# Patient Record
Sex: FEMALE | Race: WHITE | NOT HISPANIC OR LATINO | Employment: OTHER | URBAN - METROPOLITAN AREA
[De-identification: names, ages, dates, MRNs, and addresses within clinical notes are randomized per-mention and may not be internally consistent; named-entity substitution may affect disease eponyms.]

---

## 2017-04-05 ENCOUNTER — APPOINTMENT (OUTPATIENT)
Dept: WOMENS IMAGING | Facility: HOSPITAL | Age: 67
End: 2017-04-05

## 2017-04-05 PROCEDURE — 77067 SCR MAMMO BI INCL CAD: CPT | Performed by: RADIOLOGY

## 2017-04-05 PROCEDURE — 77063 BREAST TOMOSYNTHESIS BI: CPT | Performed by: RADIOLOGY

## 2018-04-10 ENCOUNTER — APPOINTMENT (OUTPATIENT)
Dept: WOMENS IMAGING | Facility: HOSPITAL | Age: 68
End: 2018-04-10

## 2018-04-10 PROCEDURE — 77067 SCR MAMMO BI INCL CAD: CPT | Performed by: RADIOLOGY

## 2018-04-10 PROCEDURE — 77063 BREAST TOMOSYNTHESIS BI: CPT | Performed by: RADIOLOGY

## 2018-04-10 PROCEDURE — 77080 DXA BONE DENSITY AXIAL: CPT | Performed by: RADIOLOGY

## 2018-08-30 ENCOUNTER — HOSPITAL ENCOUNTER (OUTPATIENT)
Dept: GENERAL RADIOLOGY | Facility: HOSPITAL | Age: 68
Discharge: HOME | End: 2018-08-30
Attending: SURGERY | Admitting: SURGERY

## 2018-08-30 ENCOUNTER — OFFICE VISIT (OUTPATIENT)
Dept: WOUND CARE | Facility: HOSPITAL | Age: 68
End: 2018-08-30
Attending: SURGERY

## 2018-08-30 DIAGNOSIS — L97.513 RIGHT FOOT ULCER, WITH NECROSIS OF MUSCLE (HCC): Primary | ICD-10-CM

## 2018-08-30 DIAGNOSIS — L97.513 RIGHT FOOT ULCER, WITH NECROSIS OF MUSCLE (HCC): ICD-10-CM

## 2018-08-30 PROCEDURE — 87205 SMEAR GRAM STAIN: CPT | Performed by: SURGERY

## 2018-08-30 PROCEDURE — 87070 CULTURE OTHR SPECIMN AEROBIC: CPT | Performed by: SURGERY

## 2018-08-30 PROCEDURE — G0463 HOSPITAL OUTPT CLINIC VISIT: HCPCS

## 2018-08-30 PROCEDURE — 73630 X-RAY EXAM OF FOOT: CPT

## 2018-08-30 PROCEDURE — 87075 CULTR BACTERIA EXCEPT BLOOD: CPT | Performed by: SURGERY

## 2018-09-02 LAB
BACTERIA SPEC AEROBE CULT: NORMAL
GRAM STN SPEC: NORMAL

## 2018-09-04 LAB — BACTERIA SPEC ANAEROBE CULT: NORMAL

## 2018-09-06 ENCOUNTER — OFFICE VISIT (OUTPATIENT)
Dept: WOUND CARE | Facility: HOSPITAL | Age: 68
End: 2018-09-06
Attending: SURGERY

## 2018-09-06 PROCEDURE — G0463 HOSPITAL OUTPT CLINIC VISIT: HCPCS

## 2018-11-16 ENCOUNTER — APPOINTMENT (OUTPATIENT)
Dept: WOUND CARE | Facility: HOSPITAL | Age: 68
End: 2018-11-16
Attending: SURGERY

## 2019-04-24 ENCOUNTER — APPOINTMENT (OUTPATIENT)
Dept: WOMENS IMAGING | Facility: HOSPITAL | Age: 69
End: 2019-04-24

## 2019-04-24 PROCEDURE — 77067 SCR MAMMO BI INCL CAD: CPT | Performed by: RADIOLOGY

## 2019-04-24 PROCEDURE — 77063 BREAST TOMOSYNTHESIS BI: CPT | Performed by: RADIOLOGY

## 2019-06-27 ENCOUNTER — TRANSCRIBE ORDERS (OUTPATIENT)
Dept: ADMINISTRATIVE | Facility: HOSPITAL | Age: 69
End: 2019-06-27

## 2019-06-27 DIAGNOSIS — R00.2 HEART PALPITATIONS: Primary | ICD-10-CM

## 2019-06-27 DIAGNOSIS — R94.31 ABNORMAL ELECTROCARDIOGRAM (ECG) (EKG): ICD-10-CM

## 2019-06-28 ENCOUNTER — HOSPITAL ENCOUNTER (OUTPATIENT)
Dept: CARDIOLOGY | Facility: HOSPITAL | Age: 69
Discharge: HOME | End: 2019-06-28
Admitting: INTERNAL MEDICINE

## 2019-06-28 DIAGNOSIS — R00.2 HEART PALPITATIONS: ICD-10-CM

## 2019-06-28 PROCEDURE — 93225 XTRNL ECG REC<48 HRS REC: CPT

## 2019-06-28 PROCEDURE — 93226 XTRNL ECG REC<48 HR SCAN A/R: CPT

## 2019-06-30 PROCEDURE — 93227 XTRNL ECG REC<48 HR R&I: CPT | Performed by: INTERNAL MEDICINE

## 2019-07-11 ENCOUNTER — TRANSCRIBE ORDERS (OUTPATIENT)
Dept: ADMINISTRATIVE | Facility: HOSPITAL | Age: 69
End: 2019-07-11

## 2019-07-11 DIAGNOSIS — R94.31 ABNORMAL EKG: Primary | ICD-10-CM

## 2019-07-12 ENCOUNTER — HOSPITAL ENCOUNTER (OUTPATIENT)
Dept: CARDIOLOGY | Facility: HOSPITAL | Age: 69
Discharge: HOME | End: 2019-07-12
Admitting: INTERNAL MEDICINE

## 2019-07-12 DIAGNOSIS — R00.2 HEART PALPITATIONS: ICD-10-CM

## 2019-07-12 LAB
AORTIC DIMENSIONLESS INDEX: 0.9 (DI)
BH CV ECHO MEAS - AI DEC SLOPE: 185 CM/SEC^2
BH CV ECHO MEAS - AI MAX PG: 65.9 MMHG
BH CV ECHO MEAS - AI MAX VEL: 406 CM/SEC
BH CV ECHO MEAS - AI P1/2T: 642.8 MSEC
BH CV ECHO MEAS - AO MAX PG (FULL): 0.96 MMHG
BH CV ECHO MEAS - AO MAX PG: 6.3 MMHG
BH CV ECHO MEAS - AO MEAN PG (FULL): 2 MMHG
BH CV ECHO MEAS - AO MEAN PG: 4 MMHG
BH CV ECHO MEAS - AO ROOT AREA (BSA CORRECTED): 1.7
BH CV ECHO MEAS - AO ROOT AREA: 6.2 CM^2
BH CV ECHO MEAS - AO ROOT DIAM: 2.8 CM
BH CV ECHO MEAS - AO V2 MAX: 125 CM/SEC
BH CV ECHO MEAS - AO V2 MEAN: 89.5 CM/SEC
BH CV ECHO MEAS - AO V2 VTI: 29.2 CM
BH CV ECHO MEAS - AVA(I,A): 2.5 CM^2
BH CV ECHO MEAS - AVA(I,D): 2.5 CM^2
BH CV ECHO MEAS - AVA(V,A): 2.6 CM^2
BH CV ECHO MEAS - AVA(V,D): 2.6 CM^2
BH CV ECHO MEAS - BSA(HAYCOCK): 1.6 M^2
BH CV ECHO MEAS - BSA: 1.6 M^2
BH CV ECHO MEAS - BZI_BMI: 21.6 KILOGRAMS/M^2
BH CV ECHO MEAS - BZI_METRIC_HEIGHT: 165.1 CM
BH CV ECHO MEAS - BZI_METRIC_WEIGHT: 59 KG
BH CV ECHO MEAS - EDV(CUBED): 50.7 ML
BH CV ECHO MEAS - EDV(MOD-SP2): 68 ML
BH CV ECHO MEAS - EDV(MOD-SP4): 63 ML
BH CV ECHO MEAS - EDV(TEICH): 58.1 ML
BH CV ECHO MEAS - EF(CUBED): 72.7 %
BH CV ECHO MEAS - EF(MOD-BP): 58 %
BH CV ECHO MEAS - EF(MOD-SP2): 57.4 %
BH CV ECHO MEAS - EF(MOD-SP4): 58.7 %
BH CV ECHO MEAS - EF(TEICH): 65.3 %
BH CV ECHO MEAS - ESV(CUBED): 13.8 ML
BH CV ECHO MEAS - ESV(MOD-SP2): 29 ML
BH CV ECHO MEAS - ESV(MOD-SP4): 26 ML
BH CV ECHO MEAS - ESV(TEICH): 20.2 ML
BH CV ECHO MEAS - FS: 35.1 %
BH CV ECHO MEAS - IVS/LVPW: 0.88
BH CV ECHO MEAS - IVSD: 0.7 CM
BH CV ECHO MEAS - LAT PEAK E' VEL: 8 CM/SEC
BH CV ECHO MEAS - LV DIASTOLIC VOL/BSA (35-75): 38.2 ML/M^2
BH CV ECHO MEAS - LV MASS(C)D: 75.4 GRAMS
BH CV ECHO MEAS - LV MASS(C)DI: 45.8 GRAMS/M^2
BH CV ECHO MEAS - LV MAX PG: 5.3 MMHG
BH CV ECHO MEAS - LV MEAN PG: 2 MMHG
BH CV ECHO MEAS - LV SYSTOLIC VOL/BSA (12-30): 15.8 ML/M^2
BH CV ECHO MEAS - LV V1 MAX: 115 CM/SEC
BH CV ECHO MEAS - LV V1 MEAN: 70.7 CM/SEC
BH CV ECHO MEAS - LV V1 VTI: 25.8 CM
BH CV ECHO MEAS - LVIDD: 3.7 CM
BH CV ECHO MEAS - LVIDS: 2.4 CM
BH CV ECHO MEAS - LVLD AP2: 7.2 CM
BH CV ECHO MEAS - LVLD AP4: 7.2 CM
BH CV ECHO MEAS - LVLS AP2: 5.4 CM
BH CV ECHO MEAS - LVLS AP4: 5.5 CM
BH CV ECHO MEAS - LVOT AREA (M): 2.8 CM^2
BH CV ECHO MEAS - LVOT AREA: 2.8 CM^2
BH CV ECHO MEAS - LVOT DIAM: 1.9 CM
BH CV ECHO MEAS - LVPWD: 0.8 CM
BH CV ECHO MEAS - MED PEAK E' VEL: 9 CM/SEC
BH CV ECHO MEAS - MV A DUR: 0.11 SEC
BH CV ECHO MEAS - MV A MAX VEL: 55.3 CM/SEC
BH CV ECHO MEAS - MV DEC SLOPE: 331 CM/SEC^2
BH CV ECHO MEAS - MV DEC TIME: 169 SEC
BH CV ECHO MEAS - MV E MAX VEL: 82 CM/SEC
BH CV ECHO MEAS - MV E/A: 1.5
BH CV ECHO MEAS - MV MAX PG: 3.7 MMHG
BH CV ECHO MEAS - MV MEAN PG: 2 MMHG
BH CV ECHO MEAS - MV P1/2T MAX VEL: 91.6 CM/SEC
BH CV ECHO MEAS - MV P1/2T: 81.1 MSEC
BH CV ECHO MEAS - MV V2 MAX: 96.1 CM/SEC
BH CV ECHO MEAS - MV V2 MEAN: 61.6 CM/SEC
BH CV ECHO MEAS - MV V2 VTI: 27.3 CM
BH CV ECHO MEAS - MVA P1/2T LCG: 2.4 CM^2
BH CV ECHO MEAS - MVA(P1/2T): 2.7 CM^2
BH CV ECHO MEAS - MVA(VTI): 2.7 CM^2
BH CV ECHO MEAS - PA ACC TIME: 0.1 SEC
BH CV ECHO MEAS - PA MAX PG (FULL): 0.81 MMHG
BH CV ECHO MEAS - PA MAX PG: 1.9 MMHG
BH CV ECHO MEAS - PA PR(ACCEL): 34 MMHG
BH CV ECHO MEAS - PA V2 MAX: 69.2 CM/SEC
BH CV ECHO MEAS - RAP SYSTOLE: 3 MMHG
BH CV ECHO MEAS - RV MAX PG: 1.1 MMHG
BH CV ECHO MEAS - RV MEAN PG: 1 MMHG
BH CV ECHO MEAS - RV V1 MAX: 52.5 CM/SEC
BH CV ECHO MEAS - RV V1 MEAN: 40.2 CM/SEC
BH CV ECHO MEAS - RV V1 VTI: 12.7 CM
BH CV ECHO MEAS - RVSP: 26 MMHG
BH CV ECHO MEAS - SI(AO): 109.2 ML/M^2
BH CV ECHO MEAS - SI(CUBED): 22.4 ML/M^2
BH CV ECHO MEAS - SI(LVOT): 44.4 ML/M^2
BH CV ECHO MEAS - SI(MOD-SP2): 23.7 ML/M^2
BH CV ECHO MEAS - SI(MOD-SP4): 22.5 ML/M^2
BH CV ECHO MEAS - SI(TEICH): 23 ML/M^2
BH CV ECHO MEAS - SV(AO): 179.8 ML
BH CV ECHO MEAS - SV(CUBED): 36.8 ML
BH CV ECHO MEAS - SV(LVOT): 73.2 ML
BH CV ECHO MEAS - SV(MOD-SP2): 39 ML
BH CV ECHO MEAS - SV(MOD-SP4): 37 ML
BH CV ECHO MEAS - SV(TEICH): 38 ML
BH CV ECHO MEAS - TAPSE (>1.6): 2.6 CM2
BH CV ECHO MEAS - TR MAX VEL: 243 CM/SEC
BH CV ECHO MEASUREMENTS AVERAGE E/E' RATIO: 9.65
BH CV XLRA - RV BASE: 3.1 CM
BH CV XLRA - TDI S': 10 CM/SEC
LEFT ATRIUM VOLUME INDEX: 18 ML/M2
MAXIMAL PREDICTED HEART RATE: 154 BPM
STRESS TARGET HR: 131 BPM

## 2019-07-12 PROCEDURE — 93306 TTE W/DOPPLER COMPLETE: CPT | Performed by: INTERNAL MEDICINE

## 2019-07-12 PROCEDURE — 93306 TTE W/DOPPLER COMPLETE: CPT

## 2019-07-15 ENCOUNTER — APPOINTMENT (OUTPATIENT)
Dept: NUCLEAR MEDICINE | Facility: HOSPITAL | Age: 69
End: 2019-07-15

## 2019-07-16 ENCOUNTER — HOSPITAL ENCOUNTER (OUTPATIENT)
Dept: CARDIOLOGY | Facility: HOSPITAL | Age: 69
Discharge: HOME | End: 2019-07-16
Admitting: INTERNAL MEDICINE

## 2019-07-16 DIAGNOSIS — R94.31 ABNORMAL EKG: ICD-10-CM

## 2019-07-16 LAB
BH CV STRESS BP STAGE 1: NORMAL
BH CV STRESS BP STAGE 2: NORMAL
BH CV STRESS BP STAGE 3: NORMAL
BH CV STRESS DURATION MIN STAGE 1: 3
BH CV STRESS DURATION MIN STAGE 2: 3
BH CV STRESS DURATION MIN STAGE 3: 3
BH CV STRESS DURATION SEC STAGE 1: 0
BH CV STRESS DURATION SEC STAGE 2: 0
BH CV STRESS DURATION SEC STAGE 3: 0
BH CV STRESS GRADE STAGE 1: 10
BH CV STRESS GRADE STAGE 2: 12
BH CV STRESS GRADE STAGE 3: 14
BH CV STRESS HR STAGE 1: 107
BH CV STRESS HR STAGE 2: 103
BH CV STRESS HR STAGE 3: 136
BH CV STRESS METS STAGE 1: 5
BH CV STRESS METS STAGE 2: 7.5
BH CV STRESS METS STAGE 3: 10
BH CV STRESS PROTOCOL 1: NORMAL
BH CV STRESS RECOVERY BP: NORMAL MMHG
BH CV STRESS RECOVERY HR: 78 BPM
BH CV STRESS SPEED STAGE 1: 1.7
BH CV STRESS SPEED STAGE 2: 2.5
BH CV STRESS SPEED STAGE 3: 3.4
BH CV STRESS STAGE 1: 1
BH CV STRESS STAGE 2: 2
BH CV STRESS STAGE 3: 3
MAXIMAL PREDICTED HEART RATE: 154 BPM
PERCENT MAX PREDICTED HR: 89.61 %
STRESS BASELINE BP: NORMAL MMHG
STRESS BASELINE HR: 72 BPM
STRESS PERCENT HR: 105 %
STRESS POST ESTIMATED WORKLOAD: 10.3 METS
STRESS POST EXERCISE DUR MIN: 9 MIN
STRESS POST EXERCISE DUR SEC: 0 SEC
STRESS POST PEAK BP: NORMAL MMHG
STRESS POST PEAK HR: 138 BPM
STRESS TARGET HR: 131 BPM

## 2019-07-16 PROCEDURE — 93017 CV STRESS TEST TRACING ONLY: CPT

## 2019-07-16 PROCEDURE — 93016 CV STRESS TEST SUPVJ ONLY: CPT | Performed by: INTERNAL MEDICINE

## 2019-07-16 PROCEDURE — 93018 CV STRESS TEST I&R ONLY: CPT | Performed by: INTERNAL MEDICINE

## 2019-07-16 RX ORDER — LORAZEPAM 0.5 MG/1
0.5 TABLET ORAL
COMMUNITY

## 2019-07-16 RX ORDER — LOSARTAN POTASSIUM 50 MG/1
100 TABLET ORAL DAILY
COMMUNITY
End: 2019-08-12 | Stop reason: DRUGHIGH

## 2019-07-16 RX ORDER — GUAIFENESIN 600 MG/1
1200 TABLET, EXTENDED RELEASE ORAL 2 TIMES DAILY
COMMUNITY

## 2019-07-16 RX ORDER — ESTRADIOL 10 UG/1
1 INSERT VAGINAL 2 TIMES WEEKLY
COMMUNITY

## 2019-07-16 RX ORDER — DOCUSATE SODIUM 100 MG/1
100 CAPSULE, LIQUID FILLED ORAL 2 TIMES DAILY
COMMUNITY

## 2019-08-12 ENCOUNTER — OFFICE VISIT (OUTPATIENT)
Dept: CARDIOLOGY | Facility: CLINIC | Age: 69
End: 2019-08-12

## 2019-08-12 VITALS
WEIGHT: 120 LBS | BODY MASS INDEX: 20.49 KG/M2 | SYSTOLIC BLOOD PRESSURE: 120 MMHG | HEART RATE: 65 BPM | DIASTOLIC BLOOD PRESSURE: 70 MMHG | HEIGHT: 64 IN

## 2019-08-12 DIAGNOSIS — R00.2 PALPITATIONS: Primary | ICD-10-CM

## 2019-08-12 DIAGNOSIS — I10 ESSENTIAL HYPERTENSION: ICD-10-CM

## 2019-08-12 DIAGNOSIS — I49.3 VENTRICULAR ECTOPY: ICD-10-CM

## 2019-08-12 PROCEDURE — 99203 OFFICE O/P NEW LOW 30 MIN: CPT | Performed by: INTERNAL MEDICINE

## 2019-08-12 PROCEDURE — 93000 ELECTROCARDIOGRAM COMPLETE: CPT | Performed by: INTERNAL MEDICINE

## 2019-08-12 RX ORDER — CHOLECALCIFEROL (VITAMIN D3) 50 MCG
2000 TABLET ORAL DAILY
COMMUNITY

## 2019-08-12 RX ORDER — SODIUM PHOSPHATE,MONO-DIBASIC 19G-7G/118
500 ENEMA (ML) RECTAL 2 TIMES DAILY
COMMUNITY

## 2019-08-12 RX ORDER — CETIRIZINE HYDROCHLORIDE 10 MG/1
10 TABLET ORAL AS NEEDED
COMMUNITY
End: 2021-03-03 | Stop reason: HOSPADM

## 2019-08-12 RX ORDER — NEBIVOLOL 5 MG/1
5 TABLET ORAL DAILY
COMMUNITY
End: 2021-03-03 | Stop reason: HOSPADM

## 2019-08-12 RX ORDER — LOSARTAN POTASSIUM 100 MG/1
100 TABLET ORAL DAILY
COMMUNITY

## 2019-08-12 NOTE — PROGRESS NOTES
[facility]      Patient Name: Ariane Rod  :1950  Age: 68 y.o.  Encounter Provider:  Dheeraj Larsen Jr, MD      Chief Complaint:   Chief Complaint   Patient presents with   • Palpitations         HPI  Ariane Rod is a 68 y.o. female with past medical history of hypertension presents for evaluation of palpitations.  She had complained to her PCP of palpitations and dizziness occasionally associated with very atypical chest discomfort.  Treadmill EKG was performed where the patient exercised for 9 minutes and had no symptoms.  There were no EKG changes but interestingly in recovery she went into ventricular bigeminy.  She also had an echocardiogram showing left ventricular ejection fraction 58% with no valvular disease.  She also had a Holter monitor which showed no more than rare PACs and PVCs.  Interestingly on that Holter monitor she had multiple complaints of chest pain shortness of air and dizziness none of which correlated with arrhythmia.  She was placed on bystolic and since then has not experienced any of the a forementioned symptoms.  She denies tobacco alcohol or illicit drug use.  No family history of sudden cardiac death.  She is tolerating all of her medications well at this time.      The following portions of the patient's history were reviewed and updated as appropriate: allergies, current medications, past family history, past medical history, past social history, past surgical history and problem list.      Review of Systems   Constitution: Negative for chills and fever.   HENT: Negative for hoarse voice and sore throat.    Eyes: Negative for double vision and photophobia.   Cardiovascular: Positive for palpitations. Negative for chest pain, leg swelling, near-syncope, orthopnea, paroxysmal nocturnal dyspnea and syncope.   Respiratory: Positive for shortness of breath. Negative for cough and wheezing.    Skin: Negative for poor wound healing and rash.  "  Musculoskeletal: Negative for arthritis and joint swelling.   Gastrointestinal: Negative for bloating, abdominal pain, hematemesis and hematochezia.   Neurological: Negative for dizziness and focal weakness.   Psychiatric/Behavioral: Negative for depression and suicidal ideas.       OBJECTIVE:   Vital Signs  Vitals:    08/12/19 1439   BP: 120/70   Pulse: 65     Estimated body mass index is 20.6 kg/m² as calculated from the following:    Height as of this encounter: 162.6 cm (64\").    Weight as of this encounter: 54.4 kg (120 lb).    Physical Exam   Constitutional: She is oriented to person, place, and time. She appears well-developed and well-nourished.   HENT:   Head: Normocephalic and atraumatic.   Eyes: Conjunctivae are normal. Pupils are equal, round, and reactive to light.   Neck: No JVD present. No thyromegaly present.   Cardiovascular: Exam reveals no gallop and no friction rub.   No murmur heard.  Pulmonary/Chest: No respiratory distress. She exhibits no tenderness.   Abdominal: Bowel sounds are normal. She exhibits no distension.   Musculoskeletal: She exhibits no edema or tenderness.   Neurological: She is alert and oriented to person, place, and time.   Skin: No rash noted. No erythema.   Psychiatric: She has a normal mood and affect. Judgment normal.   Vitals reviewed.        ECG 12 Lead  Date/Time: 8/12/2019 4:59 PM  Performed by: Dheeraj Larsen Jr., MD  Authorized by: Dheeraj Larsen Jr., MD   Previous ECG: no previous ECG available  Rhythm: sinus rhythm    Clinical impression: normal ECG            Cardiac Procedures:  1. Treadmill EKG 2019 good functional capacity no ischemia  2. Transthoracic echo 2019 normal left ventricular ejection fraction no valvular heart disease  3. Holter monitor 2019 rare PVCs and PACs symptoms reported without corollary arrhythmia        ASSESSMENT:      Diagnosis Plan   1. Palpitations     2. Ventricular ectopy     3. Essential hypertension           PLAN OF CARE: "     1. Palpitations -she does have occasional ventricular ectopy for which she is extremely sensitive.  She has been asymptomatic on Bystolic and I agree with the continuation of beta-blocker.  No indication for further testing at this time.  If patient becomes more symptomatic I would uptitrate beta-blocker.  She and I have discussed this at length and if this strategy fails we will look for a longer monitoring.  As such as an event monitor.    Return to clinic 6 months           Discharge Medications           Accurate as of 8/12/19  4:55 PM. If you have any questions, ask your nurse or doctor.               Continue These Medications      Instructions Start Date   AMBIEN PO   Oral, As Needed      BYSTOLIC 2.5 MG tablet  Generic drug:  nebivolol   2.5 mg, Oral, Daily      CALCIUM D-GLUCARATE PO   Oral      cetirizine 10 MG tablet  Commonly known as:  zyrTEC   10 mg, Oral, As Needed      diclofenac 3 % gel gel  Commonly known as:  VOLTAREN   Topical, 2 Times Daily, for 60 days.       docusate sodium 100 MG capsule  Commonly known as:  COLACE   100 mg, Oral, 2 Times Daily      estradiol 10 MCG tablet vaginal tablet  Commonly known as:  VAGIFEM   1 tablet, Vaginal, 2 Times Weekly      glucosamine sulfate 500 MG capsule capsule   Oral, 2 Times Daily      guaiFENesin 600 MG 12 hr tablet  Commonly known as:  MUCINEX   1,200 mg, Oral, 2 Times Daily      LORazepam 0.5 MG tablet  Commonly known as:  ATIVAN   Take one half tablet by mouth every evening      losartan 50 MG tablet  Commonly known as:  COZAAR   100 mg, Oral, Daily      losartan 100 MG tablet  Commonly known as:  COZAAR   100 mg, Oral, Daily      lysine 500 MG tablet   Oral, Daily      MAGNESIUM-POTASSIUM PO   Oral, Daily      TYLENOL PO   1,000 mg, Oral, Daily      Vitamin D 2000 units tablet   2,000 Units, Oral, Daily             Thank you for allowing me to participate in the care of your patient,      Sincerely,   Dheeraj Larsen Jr, MD  [facility]  08/12/19  4:55 PM    **Cristo Disclaimer:**  Much of this encounter note is an electronic transcription/translation of spoken language to printed text. The electronic translation of spoken language may permit erroneous, or at times, nonsensical words or phrases to be inadvertently transcribed. Although I have reviewed the note for such errors, some may still exist.

## 2020-02-12 ENCOUNTER — OFFICE VISIT (OUTPATIENT)
Dept: CARDIOLOGY | Facility: CLINIC | Age: 70
End: 2020-02-12

## 2020-02-12 VITALS
SYSTOLIC BLOOD PRESSURE: 136 MMHG | HEART RATE: 64 BPM | RESPIRATION RATE: 16 BRPM | OXYGEN SATURATION: 99 % | WEIGHT: 118 LBS | BODY MASS INDEX: 20.14 KG/M2 | DIASTOLIC BLOOD PRESSURE: 78 MMHG | HEIGHT: 64 IN

## 2020-02-12 DIAGNOSIS — I10 ESSENTIAL HYPERTENSION: ICD-10-CM

## 2020-02-12 DIAGNOSIS — R00.2 PALPITATIONS: Primary | ICD-10-CM

## 2020-02-12 PROCEDURE — 99214 OFFICE O/P EST MOD 30 MIN: CPT | Performed by: INTERNAL MEDICINE

## 2020-02-12 NOTE — PROGRESS NOTES
[facility]      Patient Name: Ariane Rod  :1950  Age: 69 y.o.  Encounter Provider:  Dheeraj Larsen Jr, MD      Chief Complaint:   Chief Complaint   Patient presents with   • Palpitations         Palpitations    Pertinent negatives include no chest pain, coughing, dizziness, fever, near-syncope or syncope.     Ariane Rod is a 69 y.o. female with past medical history of hypertension presents for follow-up of palpitations.      Last visit: She had complained to her PCP of palpitations and dizziness occasionally associated with very atypical chest discomfort.  Treadmill EKG was performed where the patient exercised for 9 minutes and had no symptoms.  There were no EKG changes but interestingly in recovery she went into ventricular bigeminy.  She also had an echocardiogram showing left ventricular ejection fraction 58% with no valvular disease.  She also had a Holter monitor which showed no more than rare PACs and PVCs.  Interestingly on that Holter monitor she had multiple complaints of chest pain shortness of air and dizziness none of which correlated with arrhythmia.  She was placed on bystolic and since then has not experienced any of the a forementioned symptoms.  She denies tobacco alcohol or illicit drug use.  No family history of sudden cardiac death.  She is tolerating all of her medications well at this time.    Today she feels well.  No palpitations dizziness or syncope.  She is tolerating Bystolic therapy well.  She denies chest pain or shortness of air with activity.  She does note worsening leg pain.  Her right hip has been getting steroid injections for many years which have not been providing pain relief over the last 3 to 6 months.  She saw Dr. Jean Baptiste of [facility] and he is planning for anterior approach hip replacement on the right with labrum repair.  She denies any orthopnea, PND or edema.  Treadmill stress study, echocardiogram and  "Holter monitor have been reported previously with no evidence of cardiac pathology other than ventricular ectopy for which she was started on a beta-blocker and remains asymptomatic.    The following portions of the patient's history were reviewed and updated as appropriate: allergies, current medications, past family history, past medical history, past social history, past surgical history and problem list.      Review of Systems   Constitution: Negative for chills and fever.   HENT: Negative for hoarse voice and sore throat.    Eyes: Negative for double vision and photophobia.   Cardiovascular: Negative for chest pain, leg swelling, near-syncope, orthopnea, paroxysmal nocturnal dyspnea and syncope.   Respiratory: Negative for cough and wheezing.    Skin: Negative for poor wound healing and rash.   Musculoskeletal: Negative for arthritis and joint swelling.   Gastrointestinal: Negative for bloating, abdominal pain, hematemesis and hematochezia.   Neurological: Negative for dizziness and focal weakness.   Psychiatric/Behavioral: Negative for depression and suicidal ideas.   All other systems reviewed and are negative.    ROS was reviewed, updated and amended were necessary.    OBJECTIVE:   Vital Signs  Vitals:    02/12/20 1258   BP: 136/78   Pulse: 64   Resp: 16   SpO2: 99%     Estimated body mass index is 20.25 kg/m² as calculated from the following:    Height as of this encounter: 162.6 cm (64\").    Weight as of this encounter: 53.5 kg (118 lb).    Physical Exam   Constitutional: She is oriented to person, place, and time. She appears well-developed and well-nourished.   HENT:   Head: Normocephalic and atraumatic.   Eyes: Pupils are equal, round, and reactive to light. Conjunctivae are normal.   Neck: No JVD present. No thyromegaly present.   Cardiovascular: Exam reveals no gallop and no friction rub.   No murmur heard.  Pulmonary/Chest: No respiratory distress. She exhibits no tenderness.   Abdominal: Bowel " sounds are normal. She exhibits no distension.   Musculoskeletal: She exhibits no edema or tenderness.   Neurological: She is alert and oriented to person, place, and time.   Skin: No rash noted. No erythema.   Psychiatric: She has a normal mood and affect. Judgment normal.   Vitals reviewed.    Physical exam was reviewed, updated and amended were necessary.    Procedures    Cardiac Procedures:  1. Treadmill EKG 2019 good functional capacity no ischemia  2. Transthoracic echo 2019 normal left ventricular ejection fraction no valvular heart disease  3. Holter monitor 2019 rare PVCs and PACs symptoms reported without corollary arrhythmia        ASSESSMENT:      Diagnosis Plan   1. Palpitations     2. Essential hypertension           PLAN OF CARE:     1. Perioperative risk assessment -despite musculoskeletal issues patient can still perform greater than 4 METS.  She denies any symptoms with activity.  No indication for further testing at this time.  Please review the above summation of previous cardiac testing within the past year.  Patient is low risk for perioperative MACE considering this orthopedic surgery.  Recommend continuation of perioperative beta-blocker.  2. Ventricular ectopy -well-controlled and patient remains asymptomatic on beta-blocker.  Continue same.    Return to clinic 6 months           Discharge Medications           Accurate as of February 12, 2020  1:10 PM. If you have any questions, ask your nurse or doctor.               Continue These Medications      Instructions Start Date   AMBIEN PO   Oral, As Needed      BYSTOLIC 2.5 MG tablet  Generic drug:  nebivolol   2.5 mg, Oral, Daily      CALCIUM D-GLUCARATE PO   Oral      cetirizine 10 MG tablet  Commonly known as:  zyrTEC   10 mg, Oral, As Needed      diclofenac 3 % gel gel  Commonly known as:  VOLTAREN   Topical, 2 Times Daily, for 60 days.       docusate sodium 100 MG capsule  Commonly known as:  COLACE   100 mg, Oral, 2 Times Daily       estradiol 10 MCG tablet vaginal tablet  Commonly known as:  VAGIFEM   1 tablet, Vaginal, 2 Times Weekly      glucosamine sulfate 500 MG capsule capsule   Oral, 2 Times Daily      guaiFENesin 600 MG 12 hr tablet  Commonly known as:  MUCINEX   1,200 mg, Oral, 2 Times Daily      LORazepam 0.5 MG tablet  Commonly known as:  ATIVAN   Take one half tablet by mouth every evening      losartan 100 MG tablet  Commonly known as:  COZAAR   100 mg, Oral, Daily      lysine 500 MG tablet   Oral, Daily      MAGNESIUM-POTASSIUM PO   Oral, Daily      TYLENOL PO   1,000 mg, Oral, Daily      Vitamin D 50 MCG (2000 UT) tablet   2,000 Units, Oral, Daily             Thank you for allowing me to participate in the care of your patient,      Sincerely,   Dheeraj Larsen MD  [facility]  02/12/20  1:10 PM    **Cristo Disclaimer:**  Much of this encounter note is an electronic transcription/translation of spoken language to printed text. The electronic translation of spoken language may permit erroneous, or at times, nonsensical words or phrases to be inadvertently transcribed. Although I have reviewed the note for such errors, some may still exist.

## 2020-07-15 ENCOUNTER — APPOINTMENT (OUTPATIENT)
Dept: WOMENS IMAGING | Facility: HOSPITAL | Age: 70
End: 2020-07-15

## 2020-07-15 PROCEDURE — 77063 BREAST TOMOSYNTHESIS BI: CPT | Performed by: RADIOLOGY

## 2020-07-15 PROCEDURE — 77067 SCR MAMMO BI INCL CAD: CPT | Performed by: RADIOLOGY

## 2020-09-04 ENCOUNTER — OFFICE VISIT (OUTPATIENT)
Dept: CARDIOLOGY | Facility: CLINIC | Age: 70
End: 2020-09-04

## 2020-09-04 VITALS
SYSTOLIC BLOOD PRESSURE: 120 MMHG | WEIGHT: 116 LBS | HEART RATE: 60 BPM | HEIGHT: 65 IN | BODY MASS INDEX: 19.33 KG/M2 | RESPIRATION RATE: 16 BRPM | OXYGEN SATURATION: 99 % | DIASTOLIC BLOOD PRESSURE: 78 MMHG

## 2020-09-04 DIAGNOSIS — I49.3 VENTRICULAR ECTOPY: Primary | ICD-10-CM

## 2020-09-04 PROCEDURE — 99213 OFFICE O/P EST LOW 20 MIN: CPT | Performed by: INTERNAL MEDICINE

## 2020-09-04 NOTE — PROGRESS NOTES
[facility]      Patient Name: Ariane Rod  :1950  Age: 70 y.o.  Encounter Provider:  Dheeraj Larsen Jr, MD      Chief Complaint:   Chief Complaint   Patient presents with   • Palpitations         Palpitations    Pertinent negatives include no chest pain, coughing, dizziness, fever, near-syncope or syncope.     Ariane Rod is a 70 y.o. female with past medical history of hypertension presents for follow-up of palpitations.      Summary of clinic visitation: She had complained to her PCP of palpitations and dizziness occasionally associated with very atypical chest discomfort.  Treadmill EKG was performed where the patient exercised for 9 minutes and had no symptoms.  There were no EKG changes but interestingly in recovery she went into ventricular bigeminy.  She also had an echocardiogram showing left ventricular ejection fraction 58% with no valvular disease.  She also had a Holter monitor which showed no more than rare PACs and PVCs.  Interestingly on that Holter monitor she had multiple complaints of chest pain shortness of air and dizziness none of which correlated with arrhythmia.  She was placed on bystolic and since then has not experienced any of the a forementioned symptoms.  She denies tobacco alcohol or illicit drug use. No family history of sudden cardiac death.  She is tolerating all of her medications well at this time. Today she feels well.  No palpitations dizziness or syncope.  She is tolerating Bystolic therapy well.  She denies chest pain or shortness of air with activity.  She does note worsening leg pain.  Her right hip has been getting steroid injections for many years which have not been providing pain relief over the last 3 to 6 months.  She saw Dr. Jean Baptiste of [facility] and he is planning for anterior approach hip replacement on the right with labrum repair.  She denies any orthopnea, PND or edema.  Treadmill stress study,  "echocardiogram and Holter monitor have been reported previously with no evidence of cardiac pathology other than ventricular ectopy for which she was started on a beta-blocker and remains asymptomatic.    She had both hips replaced at 3-month intervals over the summer.  She denies any chest pain or shortness of air.  She does have occasional palpitations.  She denies any dizziness or syncope.  Patient gets fatigue with Bystolic use.  She notes increased palpitations which she feels are associated with tramadol use.  She is recovering well from surgery and increasing activity as tolerated.  Social and family history reviewed and not pertinent to this clinic visit.    The following portions of the patient's history were reviewed and updated as appropriate: allergies, current medications, past family history, past medical history, past social history, past surgical history and problem list.      Review of Systems   Constitution: Negative for chills and fever.   HENT: Negative for hoarse voice and sore throat.    Eyes: Negative for double vision and photophobia.   Cardiovascular: Positive for palpitations. Negative for chest pain, leg swelling, near-syncope, orthopnea, paroxysmal nocturnal dyspnea and syncope.   Respiratory: Negative for cough and wheezing.    Skin: Negative for poor wound healing and rash.   Musculoskeletal: Negative for arthritis and joint swelling.   Gastrointestinal: Negative for bloating, abdominal pain, hematemesis and hematochezia.   Neurological: Negative for dizziness and focal weakness.   Psychiatric/Behavioral: Negative for depression and suicidal ideas.   All other systems reviewed and are negative.    ROS was reviewed, updated and amended when necessary.    OBJECTIVE:   Vital Signs  Vitals:    09/04/20 1453   Pulse: 60   Resp: 16   SpO2: 99%     Estimated body mass index is 19.3 kg/m² as calculated from the following:    Height as of this encounter: 165.1 cm (65\").    Weight as of this " encounter: 52.6 kg (116 lb).    Physical Exam   Constitutional: She is oriented to person, place, and time. She appears well-developed and well-nourished.   HENT:   Head: Normocephalic and atraumatic.   Eyes: Pupils are equal, round, and reactive to light. Conjunctivae are normal.   Neck: No JVD present. No thyromegaly present.   Cardiovascular: Exam reveals no gallop and no friction rub.   No murmur heard.  Pulmonary/Chest: No respiratory distress. She exhibits no tenderness.   Abdominal: Bowel sounds are normal. She exhibits no distension.   Musculoskeletal: She exhibits no edema or tenderness.   Neurological: She is alert and oriented to person, place, and time.   Skin: No rash noted. No erythema.   Psychiatric: She has a normal mood and affect. Judgment normal.   Vitals reviewed.    Physical exam was reviewed, updated and amended when necessary.    Procedures    Cardiac Procedures:  1. Treadmill EKG 2019 good functional capacity no ischemia  2. Transthoracic echo 2019 normal left ventricular ejection fraction no valvular heart disease  3. Holter monitor 2019 rare PVCs and PACs symptoms reported without corollary arrhythmia        ASSESSMENT:     No diagnosis found.      PLAN OF CARE:     1.  Ventricular ectopy -well-controlled and patient remains asymptomatic on beta-blocker.  The patient has a structurally normal heart and excellent functional capacity and if there are symptoms with beta-blocker I do not have a problem with her coming off of beta-blocker completely at this time.  We will monitor symptoms I am going to repeat an echocardiogram now and in 12 months.  The patient has no evidence of LV dysfunction then there will be no need to consider further therapies.  Obviously any evidence for LV dysfunction would prompt more aggressive pharmacotherapy and/or consideration for PVC ablation.    Return to clinic 6 months           Discharge Medications           Accurate as of September 4, 2020  2:57 PM. If you  have any questions, ask your nurse or doctor.               Continue These Medications      Instructions Start Date   AMBIEN PO   Oral, As Needed      Bystolic 5 MG tablet  Generic drug:  nebivolol   5 mg, Oral, Daily      CALCIUM D-GLUCARATE PO   Oral      cetirizine 10 MG tablet  Commonly known as:  zyrTEC   10 mg, Oral, As Needed      diclofenac 3 % gel gel  Commonly known as:  VOLTAREN   2 g, Topical, 2 Times Daily, for 60 days.      docusate sodium 100 MG capsule  Commonly known as:  COLACE   100 mg, Oral, 2 Times Daily      estradiol 10 MCG tablet vaginal tablet  Commonly known as:  VAGIFEM   1 tablet, Vaginal, 2 Times Weekly      glucosamine sulfate 500 MG capsule capsule   500 mg, Oral, 2 Times Daily      guaiFENesin 600 MG 12 hr tablet  Commonly known as:  MUCINEX   1,200 mg, Oral, 2 Times Daily      LORazepam 0.5 MG tablet  Commonly known as:  ATIVAN   0.5 mg, Take one half tablet by mouth every evening      losartan 100 MG tablet  Commonly known as:  COZAAR   100 mg, Oral, Daily      lysine 500 MG tablet   Oral, Daily      MAGNESIUM-POTASSIUM PO   Oral, Daily      TYLENOL PO   1,000 mg, Oral, Daily      Vitamin D 50 MCG (2000 UT) tablet   2,000 Units, Oral, Daily             Thank you for allowing me to participate in the care of your patient,      Sincerely,   Dheeraj Larsen MD  [facility]  09/04/20  14:57    **Cristo Disclaimer:**  Much of this encounter note is an electronic transcription/translation of spoken language to printed text. The electronic translation of spoken language may permit erroneous, or at times, nonsensical words or phrases to be inadvertently transcribed. Although I have reviewed the note for such errors, some may still exist.

## 2020-11-03 ENCOUNTER — TELEPHONE (OUTPATIENT)
Dept: CARDIOLOGY | Facility: CLINIC | Age: 70
End: 2020-11-03

## 2020-11-03 DIAGNOSIS — I49.3 FREQUENT PVCS: Primary | ICD-10-CM

## 2020-12-08 ENCOUNTER — HOSPITAL ENCOUNTER (OUTPATIENT)
Dept: CARDIOLOGY | Facility: HOSPITAL | Age: 70
Discharge: HOME | End: 2020-12-08
Admitting: INTERNAL MEDICINE

## 2020-12-08 VITALS
BODY MASS INDEX: 19.33 KG/M2 | OXYGEN SATURATION: 98 % | WEIGHT: 116 LBS | HEART RATE: 71 BPM | DIASTOLIC BLOOD PRESSURE: 70 MMHG | SYSTOLIC BLOOD PRESSURE: 140 MMHG | HEIGHT: 65 IN

## 2020-12-08 DIAGNOSIS — I49.3 FREQUENT PVCS: ICD-10-CM

## 2020-12-08 LAB
ASCENDING AORTA: 2.5 CM
BH CV ECHO MEAS - ACS: 1.9 CM
BH CV ECHO MEAS - AI DEC SLOPE: 268.1 CM/SEC^2
BH CV ECHO MEAS - AI MAX PG: 71.5 MMHG
BH CV ECHO MEAS - AI MAX VEL: 422.9 CM/SEC
BH CV ECHO MEAS - AI P1/2T: 462.1 MSEC
BH CV ECHO MEAS - AO MAX PG (FULL): 2.7 MMHG
BH CV ECHO MEAS - AO MAX PG: 6.1 MMHG
BH CV ECHO MEAS - AO MEAN PG (FULL): 1.5 MMHG
BH CV ECHO MEAS - AO MEAN PG: 3.6 MMHG
BH CV ECHO MEAS - AO ROOT AREA (BSA CORRECTED): 1.9
BH CV ECHO MEAS - AO ROOT AREA: 6.8 CM^2
BH CV ECHO MEAS - AO ROOT DIAM: 2.9 CM
BH CV ECHO MEAS - AO V2 MAX: 123.8 CM/SEC
BH CV ECHO MEAS - AO V2 MEAN: 87.8 CM/SEC
BH CV ECHO MEAS - AO V2 VTI: 29.1 CM
BH CV ECHO MEAS - ASC AORTA: 2.5 CM
BH CV ECHO MEAS - AVA(I,A): 2.1 CM^2
BH CV ECHO MEAS - AVA(I,D): 2.1 CM^2
BH CV ECHO MEAS - AVA(V,A): 2 CM^2
BH CV ECHO MEAS - AVA(V,D): 2 CM^2
BH CV ECHO MEAS - BSA(HAYCOCK): 1.5 M^2
BH CV ECHO MEAS - BSA: 1.6 M^2
BH CV ECHO MEAS - BZI_BMI: 19.3 KILOGRAMS/M^2
BH CV ECHO MEAS - BZI_METRIC_HEIGHT: 165.1 CM
BH CV ECHO MEAS - BZI_METRIC_WEIGHT: 52.6 KG
BH CV ECHO MEAS - EDV(MOD-SP2): 47 ML
BH CV ECHO MEAS - EDV(MOD-SP4): 51 ML
BH CV ECHO MEAS - EDV(TEICH): 64.6 ML
BH CV ECHO MEAS - EF(CUBED): 75.5 %
BH CV ECHO MEAS - EF(MOD-BP): 63.2 %
BH CV ECHO MEAS - EF(MOD-SP2): 59.6 %
BH CV ECHO MEAS - EF(MOD-SP4): 66.7 %
BH CV ECHO MEAS - EF(TEICH): 68.1 %
BH CV ECHO MEAS - ESV(MOD-SP2): 19 ML
BH CV ECHO MEAS - ESV(MOD-SP4): 17 ML
BH CV ECHO MEAS - ESV(TEICH): 20.6 ML
BH CV ECHO MEAS - FS: 37.4 %
BH CV ECHO MEAS - IVS/LVPW: 1.2
BH CV ECHO MEAS - IVSD: 0.99 CM
BH CV ECHO MEAS - LAT PEAK E' VEL: 7.6 CM/SEC
BH CV ECHO MEAS - LV DIASTOLIC VOL/BSA (35-75): 32.5 ML/M^2
BH CV ECHO MEAS - LV MASS(C)D: 104.7 GRAMS
BH CV ECHO MEAS - LV MASS(C)DI: 66.7 GRAMS/M^2
BH CV ECHO MEAS - LV MAX PG: 3.4 MMHG
BH CV ECHO MEAS - LV MEAN PG: 2.1 MMHG
BH CV ECHO MEAS - LV SYSTOLIC VOL/BSA (12-30): 10.8 ML/M^2
BH CV ECHO MEAS - LV V1 MAX: 92.6 CM/SEC
BH CV ECHO MEAS - LV V1 MEAN: 69.3 CM/SEC
BH CV ECHO MEAS - LV V1 VTI: 23.2 CM
BH CV ECHO MEAS - LVIDD: 3.9 CM
BH CV ECHO MEAS - LVIDS: 2.4 CM
BH CV ECHO MEAS - LVLD AP2: 6.3 CM
BH CV ECHO MEAS - LVLD AP4: 6.7 CM
BH CV ECHO MEAS - LVLS AP2: 5.2 CM
BH CV ECHO MEAS - LVLS AP4: 5.3 CM
BH CV ECHO MEAS - LVOT AREA (M): 2.5 CM^2
BH CV ECHO MEAS - LVOT AREA: 2.6 CM^2
BH CV ECHO MEAS - LVOT DIAM: 1.8 CM
BH CV ECHO MEAS - LVPWD: 0.82 CM
BH CV ECHO MEAS - MED PEAK E' VEL: 9.2 CM/SEC
BH CV ECHO MEAS - MR MAX PG: 78.5 MMHG
BH CV ECHO MEAS - MR MAX VEL: 443 CM/SEC
BH CV ECHO MEAS - MV A DUR: 0.1 SEC
BH CV ECHO MEAS - MV A MAX VEL: 78.2 CM/SEC
BH CV ECHO MEAS - MV DEC SLOPE: 371.6 CM/SEC^2
BH CV ECHO MEAS - MV DEC TIME: 0.17 SEC
BH CV ECHO MEAS - MV E MAX VEL: 85.3 CM/SEC
BH CV ECHO MEAS - MV E/A: 1.1
BH CV ECHO MEAS - MV MAX PG: 3.6 MMHG
BH CV ECHO MEAS - MV MEAN PG: 2 MMHG
BH CV ECHO MEAS - MV P1/2T MAX VEL: 92.9 CM/SEC
BH CV ECHO MEAS - MV P1/2T: 73.2 MSEC
BH CV ECHO MEAS - MV V2 MAX: 95.3 CM/SEC
BH CV ECHO MEAS - MV V2 MEAN: 68.2 CM/SEC
BH CV ECHO MEAS - MV V2 VTI: 28.3 CM
BH CV ECHO MEAS - MVA P1/2T LCG: 2.4 CM^2
BH CV ECHO MEAS - MVA(P1/2T): 3 CM^2
BH CV ECHO MEAS - MVA(VTI): 2.2 CM^2
BH CV ECHO MEAS - PA ACC TIME: 0.13 SEC
BH CV ECHO MEAS - PA MAX PG (FULL): 1.1 MMHG
BH CV ECHO MEAS - PA MAX PG: 2.3 MMHG
BH CV ECHO MEAS - PA PR(ACCEL): 20.4 MMHG
BH CV ECHO MEAS - PA V2 MAX: 76.4 CM/SEC
BH CV ECHO MEAS - PULM A REVS DUR: 0.13 SEC
BH CV ECHO MEAS - PULM A REVS VEL: 30.4 CM/SEC
BH CV ECHO MEAS - PULM DIAS VEL: 42.4 CM/SEC
BH CV ECHO MEAS - PULM S/D: 1.1
BH CV ECHO MEAS - PULM SYS VEL: 47.1 CM/SEC
BH CV ECHO MEAS - PVA(V,A): 1.8 CM^2
BH CV ECHO MEAS - PVA(V,D): 1.8 CM^2
BH CV ECHO MEAS - QP/QS: 0.56
BH CV ECHO MEAS - RAP SYSTOLE: 3 MMHG
BH CV ECHO MEAS - RV MAX PG: 1.2 MMHG
BH CV ECHO MEAS - RV MEAN PG: 0.79 MMHG
BH CV ECHO MEAS - RV V1 MAX: 54.8 CM/SEC
BH CV ECHO MEAS - RV V1 MEAN: 42.6 CM/SEC
BH CV ECHO MEAS - RV V1 VTI: 13.7 CM
BH CV ECHO MEAS - RVOT AREA: 2.5 CM^2
BH CV ECHO MEAS - RVOT DIAM: 1.8 CM
BH CV ECHO MEAS - RVSP: 30.9 MMHG
BH CV ECHO MEAS - SI(AO): 125.3 ML/M^2
BH CV ECHO MEAS - SI(CUBED): 27.9 ML/M^2
BH CV ECHO MEAS - SI(LVOT): 39 ML/M^2
BH CV ECHO MEAS - SI(MOD-SP2): 17.8 ML/M^2
BH CV ECHO MEAS - SI(MOD-SP4): 21.7 ML/M^2
BH CV ECHO MEAS - SI(TEICH): 28.1 ML/M^2
BH CV ECHO MEAS - SV(AO): 196.6 ML
BH CV ECHO MEAS - SV(CUBED): 43.7 ML
BH CV ECHO MEAS - SV(LVOT): 61.2 ML
BH CV ECHO MEAS - SV(MOD-SP2): 28 ML
BH CV ECHO MEAS - SV(MOD-SP4): 34 ML
BH CV ECHO MEAS - SV(RVOT): 34.2 ML
BH CV ECHO MEAS - SV(TEICH): 44.1 ML
BH CV ECHO MEAS - TAPSE (>1.6): 2.4 CM
BH CV ECHO MEAS - TR MAX VEL: 263.9 CM/SEC
BH CV ECHO MEASUREMENTS AVERAGE E/E' RATIO: 10.15
BH CV XLRA - RV BASE: 3 CM
BH CV XLRA - RV LENGTH: 6.4 CM
BH CV XLRA - RV MID: 2.5 CM
BH CV XLRA - TDI S': 11.1 CM/SEC
LEFT ATRIUM VOLUME INDEX: 17 ML/M2
LV EF 2D ECHO EST: 65 %
SINUS: 2.8 CM
STJ: 2.5 CM

## 2020-12-08 PROCEDURE — 93306 TTE W/DOPPLER COMPLETE: CPT | Performed by: INTERNAL MEDICINE

## 2020-12-08 PROCEDURE — 93306 TTE W/DOPPLER COMPLETE: CPT

## 2020-12-09 ENCOUNTER — TELEPHONE (OUTPATIENT)
Dept: CARDIOLOGY | Facility: CLINIC | Age: 70
End: 2020-12-09

## 2020-12-09 NOTE — TELEPHONE ENCOUNTER
----- Message from Dheeraj Larsen Jr., MD sent at 12/8/2020  3:26 PM EST -----  Please let patient know this is a normal echo

## 2021-03-03 ENCOUNTER — OFFICE VISIT (OUTPATIENT)
Dept: CARDIOLOGY | Facility: CLINIC | Age: 71
End: 2021-03-03

## 2021-03-03 VITALS
WEIGHT: 118 LBS | DIASTOLIC BLOOD PRESSURE: 74 MMHG | BODY MASS INDEX: 19.66 KG/M2 | HEART RATE: 71 BPM | HEIGHT: 65 IN | OXYGEN SATURATION: 99 % | SYSTOLIC BLOOD PRESSURE: 140 MMHG

## 2021-03-03 DIAGNOSIS — I49.3 FREQUENT PVCS: Primary | ICD-10-CM

## 2021-03-03 PROCEDURE — 99213 OFFICE O/P EST LOW 20 MIN: CPT | Performed by: INTERNAL MEDICINE

## 2021-03-03 NOTE — PROGRESS NOTES
[facility]      Patient Name: Ariane Rod  :1950  Age: 70 y.o.  Encounter Provider:  Dheeraj Larsen Jr, MD      Chief Complaint:   Chief Complaint   Patient presents with   • Palpitations         Palpitations   Pertinent negatives include no chest pain, coughing, dizziness, fever, near-syncope or syncope.     Ariane Rod is a 70 y.o. female with past medical history of hypertension presents for follow-up of palpitations.      Summary of clinic visitation: She had complained to her PCP of palpitations and dizziness occasionally associated with very atypical chest discomfort.  Treadmill EKG was performed where the patient exercised for 9 minutes and had no symptoms.  There were no EKG changes but interestingly in recovery she went into ventricular bigeminy.  She also had an echocardiogram showing left ventricular ejection fraction 58% with no valvular disease.  She also had a Holter monitor which showed no more than rare PACs and PVCs.  Interestingly on that Holter monitor she had multiple complaints of chest pain shortness of air and dizziness none of which correlated with arrhythmia.  She was placed on bystolic and since then has not experienced any of the a forementioned symptoms.  She denies tobacco alcohol or illicit drug use. No family history of sudden cardiac death.  She is tolerating all of her medications well at this time. Today she feels well.  No palpitations dizziness or syncope.  She is tolerating Bystolic therapy well.  She denies chest pain or shortness of air with activity.  She does note worsening leg pain.  Her right hip has been getting steroid injections for many years which have not been providing pain relief over the last 3 to 6 months.  She saw Dr. Jean Baptiste of [facility] and he is planning for anterior approach hip replacement on the right with labrum repair.  She denies any orthopnea, PND or edema.  Treadmill stress study,  "echocardiogram and Holter monitor have been reported previously with no evidence of cardiac pathology other than ventricular ectopy for which she was started on a beta-blocker and remains asymptomatic.    Doing well since last visit.  She does notice that the palpitations are coming on more frequently but states that they are of decreased intensity and do not negatively impact quality of life.  She denies any dizziness or syncope.  No chest pain or shortness of air.  She is increasing activity as tolerated.  Social and family history reviewed are not pertinent to this clinic visit.    The following portions of the patient's history were reviewed and updated as appropriate: allergies, current medications, past family history, past medical history, past social history, past surgical history and problem list.      Review of Systems   Constitution: Negative for chills and fever.   HENT: Negative for hoarse voice and sore throat.    Eyes: Negative for double vision and photophobia.   Cardiovascular: Positive for palpitations. Negative for chest pain, leg swelling, near-syncope, orthopnea, paroxysmal nocturnal dyspnea and syncope.   Respiratory: Negative for cough and wheezing.    Skin: Negative for poor wound healing and rash.   Musculoskeletal: Negative for arthritis and joint swelling.   Gastrointestinal: Negative for bloating, abdominal pain, hematemesis and hematochezia.   Neurological: Negative for dizziness and focal weakness.   Psychiatric/Behavioral: Negative for depression and suicidal ideas.   All other systems reviewed and are negative.    ROS was reviewed, updated amended when necessary.    OBJECTIVE:   Vital Signs  Vitals:    03/03/21 1258   BP: 140/74   Pulse: 71   SpO2: 99%     Estimated body mass index is 19.64 kg/m² as calculated from the following:    Height as of this encounter: 165.1 cm (65\").    Weight as of this encounter: 53.5 kg (118 lb).    Physical Exam   Constitutional: She is oriented to " person, place, and time. She appears well-developed and well-nourished.   HENT:   Head: Normocephalic and atraumatic.   Eyes: Pupils are equal, round, and reactive to light. Conjunctivae are normal.   Neck: No JVD present. No thyromegaly present.   Cardiovascular: Exam reveals no gallop and no friction rub.   No murmur heard.  Pulmonary/Chest: No respiratory distress. She exhibits no tenderness.   Abdominal: Bowel sounds are normal. She exhibits no distension.   Musculoskeletal:         General: No tenderness or edema.   Neurological: She is alert and oriented to person, place, and time.   Skin: No rash noted. No erythema.   Psychiatric: She has a normal mood and affect. Judgment normal.   Vitals reviewed.    Physical exam was reviewed, updated and amended when necessary.    Procedures    Cardiac Procedures:  1. Treadmill EKG 2019 good functional capacity no ischemia  2. Transthoracic echo 2019 normal left ventricular ejection fraction no valvular heart disease  3. Holter monitor 2019 rare PVCs and PACs symptoms reported without corollary arrhythmia        ASSESSMENT:     Frequent PVCs    PLAN OF CARE:     1.  Ventricular ectopy -patient noting increased palpitations after stopping beta-blocker.  She states that current frequency and intensity of symptoms do not negatively impact quality of life and she is not willing to go back on beta-blocker.  Last echocardiogram November 2020 showed normal left ventricular ejection fraction no significant valvular heart disease.  We will repeat an echocardiogram next year.  Follow clinical progress further treatment recommendations.    Return to clinic 12 months           Discharge Medications          Accurate as of March 3, 2021  1:00 PM. If you have any questions, ask your nurse or doctor.            Continue These Medications      Instructions Start Date   AMBIEN PO   Oral, As Needed      diclofenac 3 % gel gel  Commonly known as: VOLTAREN   2 g, Topical, 2 Times Daily, for  60 days.      docusate sodium 100 MG capsule  Commonly known as: COLACE   100 mg, Oral, 2 Times Daily      estradiol 10 MCG tablet vaginal tablet  Commonly known as: VAGIFEM   1 tablet, Vaginal, 2 Times Weekly      glucosamine sulfate 500 MG capsule capsule   500 mg, Oral, 2 Times Daily      guaiFENesin 600 MG 12 hr tablet  Commonly known as: MUCINEX   1,200 mg, Oral, 2 Times Daily      LORazepam 0.5 MG tablet  Commonly known as: ATIVAN   0.5 mg, Take one half tablet by mouth every evening      losartan 100 MG tablet  Commonly known as: COZAAR   100 mg, Oral, Daily      lysine 500 MG tablet   Oral, Daily      MAGNESIUM-POTASSIUM PO   Oral, Daily      TYLENOL PO   1,000 mg, Oral, Daily      Vitamin D 50 MCG (2000 UT) tablet   2,000 Units, Oral, Daily         Stop These Medications    Bystolic 5 MG tablet  Generic drug: nebivolol  Stopped by: Dheeraj Larsen Jr, MD     CALCIUM D-GLUCARATE PO  Stopped by: Dheeraj Larsen Jr, MD     cetirizine 10 MG tablet  Commonly known as: zyrTEC  Stopped by: Dheeraj Larsen Jr, MD            Thank you for allowing me to participate in the care of your patient,      Sincerely,   Dheeraj Larsen MD  [facility]  03/03/21  13:00 EST    **Dragon Disclaimer:**  Much of this encounter note is an electronic transcription/translation of spoken language to printed text. The electronic translation of spoken language may permit erroneous, or at times, nonsensical words or phrases to be inadvertently transcribed. Although I have reviewed the note for such errors, some may still exist.

## 2021-03-16 ENCOUNTER — BULK ORDERING (OUTPATIENT)
Dept: CASE MANAGEMENT | Facility: OTHER | Age: 71
End: 2021-03-16

## 2021-03-16 DIAGNOSIS — Z23 IMMUNIZATION DUE: ICD-10-CM

## 2021-10-26 ENCOUNTER — APPOINTMENT (OUTPATIENT)
Dept: WOMENS IMAGING | Facility: HOSPITAL | Age: 71
End: 2021-10-26

## 2021-10-26 PROCEDURE — 77067 SCR MAMMO BI INCL CAD: CPT | Performed by: RADIOLOGY

## 2021-10-26 PROCEDURE — 77080 DXA BONE DENSITY AXIAL: CPT | Performed by: RADIOLOGY

## 2021-10-26 PROCEDURE — 77063 BREAST TOMOSYNTHESIS BI: CPT | Performed by: RADIOLOGY

## 2022-03-23 ENCOUNTER — OFFICE VISIT (OUTPATIENT)
Dept: CARDIOLOGY | Facility: CLINIC | Age: 72
End: 2022-03-23

## 2022-03-23 VITALS
DIASTOLIC BLOOD PRESSURE: 62 MMHG | SYSTOLIC BLOOD PRESSURE: 122 MMHG | HEART RATE: 69 BPM | HEIGHT: 65 IN | BODY MASS INDEX: 19.83 KG/M2 | WEIGHT: 119 LBS

## 2022-03-23 DIAGNOSIS — R00.2 PALPITATIONS: Primary | ICD-10-CM

## 2022-03-23 DIAGNOSIS — I10 ESSENTIAL HYPERTENSION: ICD-10-CM

## 2022-03-23 PROCEDURE — 99214 OFFICE O/P EST MOD 30 MIN: CPT | Performed by: NURSE PRACTITIONER

## 2022-03-23 PROCEDURE — 93000 ELECTROCARDIOGRAM COMPLETE: CPT | Performed by: NURSE PRACTITIONER

## 2022-03-23 NOTE — PROGRESS NOTES
Date of Office Visit: 2022  Encounter Provider: Jelly Milian, YUAN, APRN  Place of Service: [facility]  Patient Name: Ariane Rod  :1950        Subjective:     Chief Complaint:  Palpitations, hypertension      History of Present Illness:  Ariane Rod is a 71 y.o. female patient of Dr. Larsen.  This patient is new to me and I have reviewed her records.    Patient has a history of hypertension, palpitations, atypical chest discomfort, PVCs.    In  patient reported palpitations, lightheadedness, and atypical chest discomfort to PCP.  Holter 2019 was relatively benign with rare PACs and PVCs noted.  Symptoms did not correlate with arrhythmia or ectopy.  Echo 2019 showed normal LV systolic function with EF of 58%, trace valvular regurgitation, normal RVSP.  Stress test 2019 was without evidence of ischemia, considered low risk, with workload of 10.3 METS.  Patient did demonstrate hypertensive response to exercise.  Follow-up echo 2020 showed normal LV systolic function with EF of 65%, mild mitral and tricuspid regurgitation with normal RVSP.  She has been on beta-blocker for history of palpitations.      Patient presents to office today for follow-up appointment.  Patient reports she is doing very well since last visit.  She is staying active line dancing, she does her stationary bike for 20 minutes at a time, she does yoga, and she walks her dog 3 times a day (20 minutes for the first walk, and 15 minutes each for the next 2 walks).  She denies any exertional symptoms or concerns.  Denies any chest pain or discomfort, shortness of breath, shortness of breath with exertion, edema, dizziness, syncope, near syncope, falls, fatigue, or abnormal bleeding.  She reports her palpitations are significantly improved.  In the last year she has had maybe a rare isolated skipped heartbeat sensation occurring randomly at rest.  However she has not  felt any palpitations recently.  She reports blood pressures been well controlled, about the same as today.          Past Medical History:   Diagnosis Date   • Cervical spondylosis    • Degeneration of lumbar intervertebral disc    • Essential hypertension    • Gastroesophageal reflux    • Herpes simplex    • Hypertension    • Insomnia    • Mixed hyperlipidemia    • Osteoarthritis    • Palpitations    • Tendinitis of right hip      Past Surgical History:   Procedure Laterality Date   • BLADDER REPAIR     • CERVICAL LAMINOPLASTY     • COLONOSCOPY     • HYSTERECTOMY     • OOPHORECTOMY     • OTHER SURGICAL HISTORY      revision of Bladder and bowl   • TONSILLECTOMY       Outpatient Medications Prior to Visit   Medication Sig Dispense Refill   • Acetaminophen (TYLENOL PO) Take 1,000 mg by mouth Daily.     • Cholecalciferol (VITAMIN D) 2000 units tablet Take 2,000 Units by mouth Daily.     • diclofenac (VOLTAREN) 3 % gel gel Apply 2 g topically to the appropriate area as directed 2 (Two) Times a Day. for 60 days.     • docusate sodium (COLACE) 100 MG capsule Take 100 mg by mouth 2 (Two) Times a Day.     • estradiol (VAGIFEM) 10 MCG tablet vaginal tablet Insert 1 tablet into the vagina 2 (Two) Times a Week.     • glucosamine sulfate 500 MG capsule capsule Take 500 mg by mouth 2 (Two) Times a Day.     • guaiFENesin (MUCINEX) 600 MG 12 hr tablet Take 1,200 mg by mouth 2 (Two) Times a Day.     • LORazepam (ATIVAN) 0.5 MG tablet 0.5 mg. Take one half tablet by mouth every evening     • losartan (COZAAR) 100 MG tablet Take 100 mg by mouth Daily.     • lysine 500 MG tablet Take  by mouth Daily.     • MAGNESIUM-POTASSIUM PO Take  by mouth Daily.     • Zolpidem Tartrate (AMBIEN PO) Take  by mouth As Needed.       No facility-administered medications prior to visit.       Allergies as of 03/23/2022 - Reviewed 03/23/2022   Allergen Reaction Noted   • Ibuprofen Anaphylaxis 07/16/2019     Social History     Socioeconomic History   •  "Marital status:    Tobacco Use   • Smoking status: Former Smoker     Quit date:      Years since quittin.2   • Smokeless tobacco: Never Used   • Tobacco comment: caffeine use- denies   Substance and Sexual Activity   • Alcohol use: Yes     Comment: Socially   • Drug use: No   • Sexual activity: Defer     Family History   Problem Relation Age of Onset   • Arrhythmia Mother    • Hypertension Mother    • Cancer Father         bladder, brain and melanoma       Review of Systems   Constitutional: Negative for malaise/fatigue.   Cardiovascular:        SEE HPI   Respiratory: Negative for shortness of breath.    Hematologic/Lymphatic: Negative for bleeding problem.   Musculoskeletal: Negative for falls.   Gastrointestinal: Negative for melena.   Genitourinary: Negative for hematuria.   Neurological: Negative for dizziness.   Psychiatric/Behavioral: Negative for altered mental status.          Objective:     Vitals:    22 1322   BP: 122/62   BP Location: Left arm   Patient Position: Sitting   Pulse: 69   Weight: 54 kg (119 lb)   Height: 165.1 cm (65\")     Body mass index is 19.8 kg/m².      PHYSICAL EXAM:  Constitutional:       General: Not in acute distress.     Appearance: Well-developed. Not diaphoretic.   HENT:      Head: Normocephalic and atraumatic.   Pulmonary:      Effort: Pulmonary effort is normal. No respiratory distress.      Breath sounds: Normal breath sounds. No wheezing. No rales.   Cardiovascular:      Normal rate. Regular rhythm.      Murmurs: There is no murmur.      No gallop. No click. No rub.   Edema:     Peripheral edema absent.   Abdominal:      General: Bowel sounds are normal. There is no distension.      Palpations: Abdomen is soft.   Musculoskeletal:         General: No tenderness or deformity.      Cervical back: Neck supple. Skin:     General: Skin is warm and dry.      Findings: No erythema or rash.   Neurological:      Mental Status: Alert and oriented to person, place, " and time.             ECG 12 Lead    Date/Time: 3/23/2022 1:41 PM  Performed by: Jelly Milian DNP, APRN  Authorized by: Jelly Milian DNP, APRN   Comparison: compared with previous ECG from 7/6/2020  Rhythm: sinus rhythm  BPM: 69  Comments: No significant changes from previous EKG              Assessment:       Diagnosis Plan   1. Palpitations  ECG 12 Lead   2. Essential hypertension  ECG 12 Lead         Plan:     1. Palpitations: with hx PVCs.  Palpitations much improved.  Only rare isolated at rest since last visit.  None recently.  Continue with exercise and continue to stay hydrated and avoid stimulants.  Patient wishes to defer any additional testing this year since she is feeling well.  She will call for any worsening symptoms prior to next visit.  2. Hypertension: Controlled.  Patient to continue to monitor.    Patient to follow-up with Dr. Larsen in 1 year or follow-up sooner if needed for any new, recurrent, or worsening symptoms or concerns.  Discussed in detail signs/symptoms that warrant sooner call or follow-up to the office or ER visit.           Your medication list          Accurate as of March 23, 2022  2:42 PM. If you have any questions, ask your nurse or doctor.            CONTINUE taking these medications      Instructions Last Dose Given Next Dose Due   diclofenac 3 % gel gel  Commonly known as: VOLTAREN      Apply 2 g topically to the appropriate area as directed 2 (Two) Times a Day. for 60 days.       docusate sodium 100 MG capsule  Commonly known as: COLACE      Take 100 mg by mouth 2 (Two) Times a Day.       estradiol 10 MCG tablet vaginal tablet  Commonly known as: VAGIFEM      Insert 1 tablet into the vagina 2 (Two) Times a Week.       glucosamine sulfate 500 MG capsule capsule      Take 500 mg by mouth 2 (Two) Times a Day.       guaiFENesin 600 MG 12 hr tablet  Commonly known as: MUCINEX      Take 1,200 mg by mouth 2 (Two) Times a Day.       LORazepam 0.5 MG tablet  Commonly  known as: ATIVAN      0.5 mg. Take one half tablet by mouth every evening       losartan 100 MG tablet  Commonly known as: COZAAR      Take 100 mg by mouth Daily.       lysine 500 MG tablet      Take  by mouth Daily.       MAGNESIUM-POTASSIUM PO      Take  by mouth Daily.       TYLENOL PO      Take 1,000 mg by mouth Daily.       Vitamin D 50 MCG (2000 UT) tablet      Take 2,000 Units by mouth Daily.              The above medication changes may not have been made by this provider.  Patient's medication list was updated to reflect medications they are currently taking including medication changes made by other providers.            Thanks,    Jelly Milian, DNP, APRN  03/23/2022         Dictated utilizing Dragon dictation

## 2022-10-05 ENCOUNTER — IMMUNIZATION (OUTPATIENT)
Dept: VACCINE CLINIC | Facility: HOSPITAL | Age: 72
End: 2022-10-05

## 2022-10-05 DIAGNOSIS — Z23 NEED FOR VACCINATION: Primary | ICD-10-CM

## 2022-10-05 PROCEDURE — 91312 HC SARSCOV2 VAC 30MCG/0.3ML IM BIVALENT BOOSTER 12 YRS AND OLDER: CPT | Performed by: INTERNAL MEDICINE

## 2022-10-05 PROCEDURE — 0124A: CPT | Performed by: INTERNAL MEDICINE

## 2022-11-02 ENCOUNTER — APPOINTMENT (OUTPATIENT)
Dept: WOMENS IMAGING | Facility: HOSPITAL | Age: 72
End: 2022-11-02

## 2022-11-02 PROCEDURE — 77067 SCR MAMMO BI INCL CAD: CPT | Performed by: RADIOLOGY

## 2022-11-02 PROCEDURE — 77063 BREAST TOMOSYNTHESIS BI: CPT | Performed by: RADIOLOGY

## 2022-12-21 ENCOUNTER — TELEPHONE (OUTPATIENT)
Dept: CARDIOLOGY | Facility: CLINIC | Age: 72
End: 2022-12-21

## 2022-12-21 ENCOUNTER — OFFICE VISIT (OUTPATIENT)
Dept: CARDIOLOGY | Facility: CLINIC | Age: 72
End: 2022-12-21

## 2022-12-21 ENCOUNTER — HOSPITAL ENCOUNTER (OUTPATIENT)
Dept: CARDIOLOGY | Facility: HOSPITAL | Age: 72
Discharge: HOME | End: 2022-12-21
Admitting: NURSE PRACTITIONER

## 2022-12-21 VITALS
OXYGEN SATURATION: 98 % | HEIGHT: 65 IN | DIASTOLIC BLOOD PRESSURE: 70 MMHG | WEIGHT: 121 LBS | BODY MASS INDEX: 20.16 KG/M2 | SYSTOLIC BLOOD PRESSURE: 160 MMHG | HEART RATE: 62 BPM

## 2022-12-21 DIAGNOSIS — I10 ESSENTIAL HYPERTENSION: Primary | ICD-10-CM

## 2022-12-21 DIAGNOSIS — I49.3 VENTRICULAR ECTOPY: ICD-10-CM

## 2022-12-21 DIAGNOSIS — R00.2 PALPITATIONS: ICD-10-CM

## 2022-12-21 LAB
ANION GAP SERPL CALCULATED.3IONS-SCNC: 10.2 MMOL/L (ref 5–15)
BUN SERPL-MCNC: 17 MG/DL (ref 8–23)
BUN/CREAT SERPL: 27.4 (ref 7–25)
CALCIUM SPEC-SCNC: 8.7 MG/DL (ref 8.6–10.5)
CHLORIDE SERPL-SCNC: 106 MMOL/L (ref 98–107)
CO2 SERPL-SCNC: 26.8 MMOL/L (ref 22–29)
CREAT SERPL-MCNC: 0.62 MG/DL (ref 0.57–1)
EGFRCR SERPLBLD CKD-EPI 2021: 95.9 ML/MIN/1.73
GLUCOSE SERPL-MCNC: 97 MG/DL (ref 65–99)
MAGNESIUM SERPL-MCNC: 2.3 MG/DL (ref 1.6–2.4)
POTASSIUM SERPL-SCNC: 4.2 MMOL/L (ref 3.5–5.2)
SODIUM SERPL-SCNC: 143 MMOL/L (ref 136–145)
T-UPTAKE NFR SERPL: 1.09 TBI (ref 0.8–1.3)
T4 SERPL-MCNC: 5.05 MCG/DL (ref 4.5–11.7)
TSH SERPL DL<=0.05 MIU/L-ACNC: 0.93 UIU/ML (ref 0.27–4.2)

## 2022-12-21 PROCEDURE — 80048 BASIC METABOLIC PNL TOTAL CA: CPT | Performed by: NURSE PRACTITIONER

## 2022-12-21 PROCEDURE — 84479 ASSAY OF THYROID (T3 OR T4): CPT | Performed by: NURSE PRACTITIONER

## 2022-12-21 PROCEDURE — 36415 COLL VENOUS BLD VENIPUNCTURE: CPT

## 2022-12-21 PROCEDURE — 84436 ASSAY OF TOTAL THYROXINE: CPT | Performed by: NURSE PRACTITIONER

## 2022-12-21 PROCEDURE — 93000 ELECTROCARDIOGRAM COMPLETE: CPT | Performed by: NURSE PRACTITIONER

## 2022-12-21 PROCEDURE — 84443 ASSAY THYROID STIM HORMONE: CPT | Performed by: NURSE PRACTITIONER

## 2022-12-21 PROCEDURE — 83735 ASSAY OF MAGNESIUM: CPT | Performed by: NURSE PRACTITIONER

## 2022-12-21 PROCEDURE — 99214 OFFICE O/P EST MOD 30 MIN: CPT | Performed by: NURSE PRACTITIONER

## 2022-12-21 RX ORDER — BISOPROLOL FUMARATE 5 MG/1
2.5 TABLET, FILM COATED ORAL DAILY
Qty: 15 TABLET | Refills: 11 | Status: SHIPPED | OUTPATIENT
Start: 2022-12-21 | End: 2023-02-08

## 2022-12-21 NOTE — PROGRESS NOTES
Date of Office Visit: 2022  Encounter Provider: TONY Ashlye  Place of Service: [facility] CARDIOLOG[facility]  Patient Name: Ariane Rod  :1950    Chief Complaint   Patient presents with   • pvc   :     HPI: Ariane Rod is a 72 y.o. female who is a patient of  Dr. Larsen and is new to me today. She has a history of hypertensions, palpitations, atypical chest pain and PVCs.  She had a Holter monitor in 2019 that showed PACs and PVCs.  Her symptoms did not correlate with any arrhythmia or ectopy.  Echo at that time was stable with some trace valvular regurg she had a stress test that was negative with a workload of 10.3 METS.  She did have a hypertensive response to exercise.  She has been on beta-blocker therapy for history of palpitations.  She was last in the office in March, she had been doing well she rides an exercise bike and walks her dog as well as does yoga.  She called the office today because she is having palpitations and came in to be evaluated.    Over the last month she has been experiencing palpitations.  When she has them she also feels like her chest is tight.  Her blood pressure sometimes runs high when this is happening.  Her blood pressure is elevated today at 160/70 and her recheck was 142/62.  She is having some PVCs on her twelve-lead today.  She used to be on Bystolic but this was stopped due to indigestion that she got when she took it.  She has taken metoprolol before and had bronchospasm.  Previous testing and notes have been reviewed by me.   Past Medical History:   Diagnosis Date   • Cervical spondylosis    • Degeneration of lumbar intervertebral disc    • Essential hypertension    • Gastroesophageal reflux    • Herpes simplex    • Hypertension    • Insomnia    • Mixed hyperlipidemia    • Osteoarthritis    • Palpitations    • Tendinitis of right hip        Past Surgical History:   Procedure Laterality Date   • BLADDER REPAIR      • CERVICAL LAMINOPLASTY     • COLONOSCOPY     • HYSTERECTOMY     • OOPHORECTOMY     • OTHER SURGICAL HISTORY      revision of Bladder and bowl   • TONSILLECTOMY         Social History     Socioeconomic History   • Marital status:    Tobacco Use   • Smoking status: Former     Types: Cigarettes     Quit date:      Years since quittin.9   • Smokeless tobacco: Never   • Tobacco comments:     caffeine use- denies   Substance and Sexual Activity   • Alcohol use: Yes     Comment: Socially   • Drug use: No   • Sexual activity: Defer       Family History   Problem Relation Age of Onset   • Arrhythmia Mother    • Hypertension Mother    • Cancer Father         bladder, brain and melanoma       Review of Systems   Constitutional: Negative for diaphoresis and malaise/fatigue.   Cardiovascular: Positive for irregular heartbeat and palpitations. Negative for chest pain, claudication, dyspnea on exertion, leg swelling, near-syncope, orthopnea, paroxysmal nocturnal dyspnea and syncope.   Respiratory: Negative for cough, shortness of breath and sleep disturbances due to breathing.    Musculoskeletal: Negative for falls.   Neurological: Negative for dizziness and weakness.   Psychiatric/Behavioral: Negative for altered mental status and substance abuse.       Allergies   Allergen Reactions   • Ibuprofen Anaphylaxis         Current Outpatient Medications:   •  Acetaminophen (TYLENOL PO), Take 1,000 mg by mouth Daily., Disp: , Rfl:   •  Cholecalciferol (VITAMIN D) 2000 units tablet, Take 2,000 Units by mouth Daily., Disp: , Rfl:   •  diclofenac (VOLTAREN) 3 % gel gel, Apply 2 g topically to the appropriate area as directed 2 (Two) Times a Day. for 60 days., Disp: , Rfl:   •  docusate sodium (COLACE) 100 MG capsule, Take 100 mg by mouth 2 (Two) Times a Day., Disp: , Rfl:   •  estradiol (VAGIFEM) 10 MCG tablet vaginal tablet, Insert 1 tablet into the vagina 2 (Two) Times a Week., Disp: , Rfl:   •  glucosamine sulfate  "500 MG capsule capsule, Take 500 mg by mouth 2 (Two) Times a Day., Disp: , Rfl:   •  guaiFENesin (MUCINEX) 600 MG 12 hr tablet, Take 1,200 mg by mouth 2 (Two) Times a Day., Disp: , Rfl:   •  LORazepam (ATIVAN) 0.5 MG tablet, 0.5 mg. Take one half tablet by mouth every evening, Disp: , Rfl:   •  losartan (COZAAR) 100 MG tablet, Take 100 mg by mouth Daily., Disp: , Rfl:   •  lysine 500 MG tablet, Take  by mouth Daily., Disp: , Rfl:   •  MAGNESIUM-POTASSIUM PO, Take  by mouth Daily., Disp: , Rfl:       Objective:     Vitals:    12/21/22 1502   BP: 160/70   Pulse: (!) 41   SpO2: 98%   Weight: 54.9 kg (121 lb)   Height: 165.1 cm (65\")     Body mass index is 20.14 kg/m².    PHYSICAL EXAM:    Constitutional:       General: Not in acute distress.     Appearance: Normal appearance. Well-developed.   Eyes:      Pupils: Pupils are equal, round, and reactive to light.   HENT:      Head: Normocephalic.   Neck:      Vascular: No carotid bruit or JVD.   Pulmonary:      Effort: Pulmonary effort is normal. No tachypnea.      Breath sounds: Normal breath sounds. No wheezing. No rales.   Cardiovascular:      Normal rate. Regular rhythm.      No gallop.   Pulses:     Intact distal pulses.   Edema:     Peripheral edema absent.   Abdominal:      General: Bowel sounds are normal.      Palpations: Abdomen is soft.      Tenderness: There is no abdominal tenderness.   Musculoskeletal: Normal range of motion.      Cervical back: Normal range of motion and neck supple. No edema. Skin:     General: Skin is warm and dry.   Neurological:      Mental Status: Alert and oriented to person, place, and time.           ECG 12 Lead    Date/Time: 12/21/2022 3:29 PM  Performed by: Lacie Holman APRN  Authorized by: Lacie Holman APRN   Comparison: compared with previous ECG from 3/23/2022  Similar to previous ECG  Rhythm: sinus rhythm  Ectopy: infrequent PVCs  Rate: normal  Q waves: V1 and V2    QRS axis: normal    Clinical impression: " non-specific ECG              Assessment:       Diagnosis Plan   1. Essential hypertension        2. Palpitations        3. Ventricular ectopy          No orders of the defined types were placed in this encounter.         Plan:       I am going to put her on a small dose of bisoprolol 2.5 mg daily.  I am also going to get a Holter to quantify just how many PVCs she is having over what amount of time.  We will get some baseline labs and electrolytes today as well as a thyroid panel.  I will call her with results and I want her to come back and see me in a month.         Your medication list          Accurate as of December 21, 2022  3:05 PM. If you have any questions, ask your nurse or doctor.            CONTINUE taking these medications      Instructions Last Dose Given Next Dose Due   diclofenac 3 % gel gel  Commonly known as: VOLTAREN      Apply 2 g topically to the appropriate area as directed 2 (Two) Times a Day. for 60 days.       docusate sodium 100 MG capsule  Commonly known as: COLACE      Take 100 mg by mouth 2 (Two) Times a Day.       estradiol 10 MCG tablet vaginal tablet  Commonly known as: VAGIFEM      Insert 1 tablet into the vagina 2 (Two) Times a Week.       glucosamine sulfate 500 MG capsule capsule      Take 500 mg by mouth 2 (Two) Times a Day.       guaiFENesin 600 MG 12 hr tablet  Commonly known as: MUCINEX      Take 1,200 mg by mouth 2 (Two) Times a Day.       LORazepam 0.5 MG tablet  Commonly known as: ATIVAN      0.5 mg. Take one half tablet by mouth every evening       losartan 100 MG tablet  Commonly known as: COZAAR      Take 100 mg by mouth Daily.       lysine 500 MG tablet      Take  by mouth Daily.       MAGNESIUM-POTASSIUM PO      Take  by mouth Daily.       TYLENOL PO      Take 1,000 mg by mouth Daily.       Vitamin D 50 MCG (2000 UT) tablet      Take 2,000 Units by mouth Daily.                As always, it has been a pleasure to participate in your patient's care.      Sincerely,      Lacie JIMENEZ

## 2022-12-21 NOTE — TELEPHONE ENCOUNTER
Patient scheduled to see LR today. This can be addressed at that time.    Deidre Aguilar RN  [place] [facility]

## 2022-12-21 NOTE — TELEPHONE ENCOUNTER
Pt called stating she is having frequent PVC's, denies any chest pain and SOA.  Can you please call to triage?  Thanks/alisha

## 2022-12-21 NOTE — TELEPHONE ENCOUNTER
Would see if we can get her into see an APRN today so EKG can be checked and further evaluation/treatment as indicated based on symptoms and exam/ EKG findings at that time.

## 2022-12-21 NOTE — TELEPHONE ENCOUNTER
"Spoke to patient. She has a history of PVCs, but since thanksgiving she feels like they have increased. She said when she feels her pulse they feel like they are happening every other beat. She does not check her BP and HR because she feels like it will cause more anxiety for her. She tried to take a BP and HR while I had her on the phone, but her machine is not working. There have been a few occasions with the episodes that she feels a little lightheaded and her chest is a little sore. She said the episodes also cause her to have a \"nervous\" feeling. She said she can really feel the PVCs when she is resting. She takes losartan 100mg daily and magnesium-potassium supplement daily.    Patient states she will be out so if she does not answer we can leave a VM of further recommendations.    Deidre Aguilar RN  Triage [facility]    "

## 2022-12-21 NOTE — TELEPHONE ENCOUNTER
PT WANTED TO GIVE A MESSAGE TO CARLOS WHO IS UNDER DR OCAMPO.     SHE HAS TRIED VYSTOLIC AND IT GIVES HER TERRIBLE REFLUX AND IT IS NOT GOOD FOR HER. SHE DOES NOT WANT TO TAKE IT ANY LONGER. SHE WOULD LIKE A DIFFERENT MED, NOT VYSTOLIC.

## 2023-01-17 ENCOUNTER — TELEPHONE (OUTPATIENT)
Dept: CARDIOLOGY | Facility: CLINIC | Age: 73
End: 2023-01-17
Payer: MEDICARE

## 2023-01-17 DIAGNOSIS — R94.31 ABNORMAL ELECTROCARDIOGRAM (ECG) (EKG): ICD-10-CM

## 2023-01-17 DIAGNOSIS — I47.29 VENTRICULAR TACHYCARDIA (PAROXYSMAL): Primary | ICD-10-CM

## 2023-01-17 RX ORDER — DILTIAZEM HYDROCHLORIDE 120 MG/1
120 CAPSULE, COATED, EXTENDED RELEASE ORAL DAILY
Qty: 30 CAPSULE | Refills: 11 | Status: SHIPPED | OUTPATIENT
Start: 2023-01-17

## 2023-01-17 NOTE — TELEPHONE ENCOUNTER
Called patient about Holter results.  She is having PVCs 16% of the time runs of nonsustained ventricular tachycardia.  She felt like they worsened when she took bisoprolol and she is not taking it.  I am going to order a Cardiolite stress test to rule out ischemic causes of frequent ectopy and start her on diltiazem 120 mg daily.

## 2023-01-27 ENCOUNTER — HOSPITAL ENCOUNTER (OUTPATIENT)
Dept: CARDIOLOGY | Facility: HOSPITAL | Age: 73
Discharge: HOME | End: 2023-01-27
Admitting: NURSE PRACTITIONER
Payer: MEDICARE

## 2023-01-27 VITALS — WEIGHT: 120 LBS | HEIGHT: 65 IN | BODY MASS INDEX: 19.99 KG/M2

## 2023-01-27 DIAGNOSIS — R94.31 ABNORMAL ELECTROCARDIOGRAM (ECG) (EKG): ICD-10-CM

## 2023-01-27 DIAGNOSIS — I47.29 VENTRICULAR TACHYCARDIA (PAROXYSMAL): ICD-10-CM

## 2023-01-27 LAB
BH CV NUCLEAR PRIOR STUDY: 2
BH CV REST NUCLEAR ISOTOPE DOSE: 6.3 MCI
BH CV STRESS BP STAGE 1: NORMAL
BH CV STRESS COMMENTS STAGE 1: NORMAL
BH CV STRESS DOSE REGADENOSON STAGE 1: 0.4
BH CV STRESS DURATION MIN STAGE 1: 0
BH CV STRESS DURATION SEC STAGE 1: 10
BH CV STRESS HR STAGE 1: 95
BH CV STRESS NUCLEAR ISOTOPE DOSE: 23.4 MCI
BH CV STRESS PROTOCOL 1: NORMAL
BH CV STRESS RECOVERY BP: NORMAL MMHG
BH CV STRESS RECOVERY HR: 82 BPM
BH CV STRESS STAGE 1: 1
LV EF NUC BP: 63 %
MAXIMAL PREDICTED HEART RATE: 150 BPM
PERCENT MAX PREDICTED HR: 63.33 %
STRESS BASELINE BP: NORMAL MMHG
STRESS BASELINE HR: 79 BPM
STRESS PERCENT HR: 75 %
STRESS POST EXERCISE DUR SEC: 10 SEC
STRESS POST PEAK BP: NORMAL MMHG
STRESS POST PEAK HR: 95 BPM
STRESS TARGET HR: 128 BPM

## 2023-01-27 PROCEDURE — 0 TECHNETIUM TETROFOSMIN KIT: Performed by: NURSE PRACTITIONER

## 2023-01-27 PROCEDURE — 25010000002 REGADENOSON 0.4 MG/5ML SOLUTION: Performed by: NURSE PRACTITIONER

## 2023-01-27 PROCEDURE — 93018 CV STRESS TEST I&R ONLY: CPT | Performed by: INTERNAL MEDICINE

## 2023-01-27 PROCEDURE — 93016 CV STRESS TEST SUPVJ ONLY: CPT | Performed by: INTERNAL MEDICINE

## 2023-01-27 PROCEDURE — 93017 CV STRESS TEST TRACING ONLY: CPT

## 2023-01-27 PROCEDURE — A9502 TC99M TETROFOSMIN: HCPCS | Performed by: NURSE PRACTITIONER

## 2023-01-27 PROCEDURE — 78452 HT MUSCLE IMAGE SPECT MULT: CPT | Performed by: INTERNAL MEDICINE

## 2023-01-27 PROCEDURE — 78452 HT MUSCLE IMAGE SPECT MULT: CPT

## 2023-01-27 RX ADMIN — REGADENOSON 0.4 MG: 0.08 INJECTION, SOLUTION INTRAVENOUS at 14:00

## 2023-01-27 RX ADMIN — TETROFOSMIN 1 DOSE: 1.38 INJECTION, POWDER, LYOPHILIZED, FOR SOLUTION INTRAVENOUS at 13:12

## 2023-01-27 RX ADMIN — TETROFOSMIN 1 DOSE: 1.38 INJECTION, POWDER, LYOPHILIZED, FOR SOLUTION INTRAVENOUS at 14:00

## 2023-01-30 ENCOUNTER — TELEPHONE (OUTPATIENT)
Dept: CARDIOLOGY | Facility: CLINIC | Age: 73
End: 2023-01-30
Payer: MEDICARE

## 2023-01-30 NOTE — TELEPHONE ENCOUNTER
----- Message from TONY Ashley sent at 1/30/2023  1:05 PM EST -----  Let patient know her stress test was stable. Her PVCs actually reduced during the test.  ----- Message -----  From: Trung Godinez MD  Sent: 1/27/2023   3:15 PM EST  To: TONY Ashley

## 2023-01-30 NOTE — TELEPHONE ENCOUNTER
Called and left VM. Will continue to try to reach patient.     Deidre Aguilar RN  Triage [facility]

## 2023-01-31 NOTE — TELEPHONE ENCOUNTER
Notified patient of results/recommendations. Patient verbalized understanding.    Deidre Aguilar RN  Triage [facility]

## 2023-02-08 ENCOUNTER — OFFICE VISIT (OUTPATIENT)
Dept: CARDIOLOGY | Facility: CLINIC | Age: 73
End: 2023-02-08
Payer: MEDICARE

## 2023-02-08 VITALS
BODY MASS INDEX: 19.99 KG/M2 | SYSTOLIC BLOOD PRESSURE: 179 MMHG | DIASTOLIC BLOOD PRESSURE: 86 MMHG | WEIGHT: 120 LBS | OXYGEN SATURATION: 97 % | HEART RATE: 67 BPM | HEIGHT: 65 IN

## 2023-02-08 DIAGNOSIS — I49.3 VENTRICULAR ECTOPY: ICD-10-CM

## 2023-02-08 DIAGNOSIS — I10 ESSENTIAL HYPERTENSION: Primary | ICD-10-CM

## 2023-02-08 DIAGNOSIS — I44.7 LBBB (LEFT BUNDLE BRANCH BLOCK): ICD-10-CM

## 2023-02-08 DIAGNOSIS — R00.2 PALPITATIONS: ICD-10-CM

## 2023-02-08 PROCEDURE — 93000 ELECTROCARDIOGRAM COMPLETE: CPT | Performed by: NURSE PRACTITIONER

## 2023-02-08 PROCEDURE — 99214 OFFICE O/P EST MOD 30 MIN: CPT | Performed by: NURSE PRACTITIONER

## 2023-02-08 RX ORDER — DOXAZOSIN 2 MG/1
2 TABLET ORAL NIGHTLY
Qty: 30 TABLET | Refills: 11 | Status: SHIPPED | OUTPATIENT
Start: 2023-02-08

## 2023-02-08 RX ORDER — DILTIAZEM HYDROCHLORIDE 120 MG/1
CAPSULE, EXTENDED RELEASE ORAL
COMMUNITY

## 2023-02-08 NOTE — PROGRESS NOTES
Date of Office Visit: 2023  Encounter Provider: TONY Ashley  Place of Service: [facility] CARDIOLOGY  Patient Name: Ariane Rod  :1950    Chief Complaint   Patient presents with   • Follow-up   • Hypertension   :     HPI: Ariane Rod is a 72 y.o. female who is a patient of Dr. Fernandez and is known to me from previous.  She has a history of hypertension,palpitations, atypical chest pain and PVCs.  She had a Holter monitor in  that showed PACs and PVCs.  Her symptoms did not correlate with any arrhythmia or ectopy.  Echo at that time was stable with some trace valvular regurg she had a stress test that was negative with a workload of 10.3 METS.  She did have a hypertensive response to exercise.  She has been on beta-blocker therapy for history of palpitations.  She was last in the office in March, she had been doing well she rides an exercise bike and walks her dog as well as does yoga.    6 I saw her in the office on  she was having some palpitations she had also called the office because her blood pressure was elevated.  She used to be on Bystolic but it was stopped due to indigestion when she took it.  She got bronchospasm from metoprolol.  I put her on a small dose of bisoprolol 2.5 mg daily and I ordered a Holter to quantify her PVCs.  Holter showed frequent ventricular ectopy about 16% of the time with some short nonsustained runs.  We also did a stress test that was negative for ischemia.  1 thing to note during stress is that she developed a left bundle.    She did not tolerate the bisoprolol she had to stop it due to chest worsening of palpitations.  Her blood pressure has been anywhere from 130s to 170s at home.  I did start her on diltiazem 120 mg a day she said this did show some improvement in her palpitations but she still having them mostly she notices them at night.  Previous testing and notes have been reviewed by  me.   Past Medical History:   Diagnosis Date   • Cervical spondylosis    • Degeneration of lumbar intervertebral disc    • Essential hypertension    • Gastroesophageal reflux    • Herpes simplex    • Hypertension    • Insomnia    • Mixed hyperlipidemia    • Osteoarthritis    • Palpitations    • Tendinitis of right hip        Past Surgical History:   Procedure Laterality Date   • BLADDER REPAIR     • CERVICAL LAMINOPLASTY     • COLONOSCOPY     • HYSTERECTOMY     • OOPHORECTOMY     • OTHER SURGICAL HISTORY      revision of Bladder and bowl   • TONSILLECTOMY         Social History     Socioeconomic History   • Marital status:    Tobacco Use   • Smoking status: Former     Types: Cigarettes     Quit date:      Years since quittin.1   • Smokeless tobacco: Never   • Tobacco comments:     caffeine use- denies   Substance and Sexual Activity   • Alcohol use: Yes     Comment: Socially   • Drug use: No   • Sexual activity: Defer       Family History   Problem Relation Age of Onset   • Arrhythmia Mother    • Hypertension Mother    • Cancer Father         bladder, brain and melanoma       Review of Systems   Constitutional: Negative for diaphoresis and malaise/fatigue.   Cardiovascular: Positive for palpitations. Negative for chest pain, claudication, dyspnea on exertion, irregular heartbeat, leg swelling, near-syncope, orthopnea, paroxysmal nocturnal dyspnea and syncope.   Respiratory: Negative for cough, shortness of breath and sleep disturbances due to breathing.    Musculoskeletal: Negative for falls.   Neurological: Negative for dizziness and weakness.   Psychiatric/Behavioral: Negative for altered mental status and substance abuse.       Allergies   Allergen Reactions   • Ibuprofen Anaphylaxis   • Metoprolol Unknown - High Severity     Chest tightness         Current Outpatient Medications:   •  Acetaminophen (TYLENOL PO), Take 1,000 mg by mouth Daily., Disp: , Rfl:   •  Cholecalciferol (VITAMIN D)   "units tablet, Take 2,000 Units by mouth Daily., Disp: , Rfl:   •  diclofenac (VOLTAREN) 3 % gel gel, Apply 2 g topically to the appropriate area as directed 2 (Two) Times a Day. for 60 days., Disp: , Rfl:   •  dilTIAZem (TIAZAC) 120 MG 24 hr capsule, diltiazem  mg capsule,extended release 24 hr, Disp: , Rfl:   •  dilTIAZem CD (CARDIZEM CD) 120 MG 24 hr capsule, Take 1 capsule by mouth Daily., Disp: 30 capsule, Rfl: 11  •  docusate sodium (COLACE) 100 MG capsule, Take 100 mg by mouth 2 (Two) Times a Day., Disp: , Rfl:   •  estradiol (VAGIFEM) 10 MCG tablet vaginal tablet, Insert 1 tablet into the vagina 2 (Two) Times a Week., Disp: , Rfl:   •  glucosamine sulfate 500 MG capsule capsule, Take 500 mg by mouth 2 (Two) Times a Day., Disp: , Rfl:   •  guaiFENesin (MUCINEX) 600 MG 12 hr tablet, Take 1,200 mg by mouth 2 (Two) Times a Day., Disp: , Rfl:   •  LORazepam (ATIVAN) 0.5 MG tablet, 0.5 mg. Take one half tablet by mouth every evening, Disp: , Rfl:   •  losartan (COZAAR) 100 MG tablet, Take 100 mg by mouth Daily., Disp: , Rfl:   •  lysine 500 MG tablet, Take  by mouth Daily., Disp: , Rfl:   •  MAGNESIUM-POTASSIUM PO, Take  by mouth Daily., Disp: , Rfl:   •  doxazosin (Cardura) 2 MG tablet, Take 1 tablet by mouth Every Night., Disp: 30 tablet, Rfl: 11      Objective:     Vitals:    02/08/23 1410   BP: 179/86   Pulse: 67   SpO2: 97%   Weight: 54.4 kg (120 lb)   Height: 165.1 cm (65\")     Body mass index is 19.97 kg/m².    PHYSICAL EXAM:    Constitutional:       General: Not in acute distress.     Appearance: Normal appearance. Well-developed.   Eyes:      Pupils: Pupils are equal, round, and reactive to light.   HENT:      Head: Normocephalic.   Neck:      Vascular: No carotid bruit or JVD.   Pulmonary:      Effort: Pulmonary effort is normal. No tachypnea.      Breath sounds: Normal breath sounds. No wheezing. No rales.   Cardiovascular:      Normal rate. Regular rhythm. Occasionally irregular      No gallop. "   Pulses:     Intact distal pulses.   Edema:     Peripheral edema absent.   Abdominal:      General: Bowel sounds are normal.      Palpations: Abdomen is soft.      Tenderness: There is no abdominal tenderness.   Musculoskeletal: Normal range of motion.      Cervical back: Normal range of motion and neck supple. No edema. Skin:     General: Skin is warm and dry.   Neurological:      Mental Status: Alert and oriented to person, place, and time.           ECG 12 Lead    Date/Time: 2/8/2023 3:14 PM  Performed by: Lacie Holman APRN  Authorized by: Lacie Holman APRN   Comparison: compared with previous ECG from 12/21/2022  Comparison to previous ECG: Less PVCs  Rhythm: sinus rhythm  Rate: normal  Conduction: left bundle branch block  QRS axis: normal    Clinical impression: non-specific ECG              Assessment:       Diagnosis Plan   1. Essential hypertension        2. Ventricular ectopy  Ambulatory Referral to Cardiac Electrophysiology      3. Palpitations  Ambulatory Referral to Cardiac Electrophysiology      4. LBBB (left bundle branch block)          Orders Placed This Encounter   Procedures   • Ambulatory Referral to Cardiac Electrophysiology     Referral Priority:   Routine     Referral Type:   Consultation     Referral Reason:   Specialty Services Required     Referred to Provider:   Dheeraj Guadarrama MD     Requested Specialty:   Cardiac Electrophysiology     Number of Visits Requested:   1   • ECG 12 Lead     This order was created via procedure documentation     Order Specific Question:   Release to patient     Answer:   Routine Release          Plan:       In terms of medications, she is on max dose of losartan.  She is not tolerating beta-blockers due to chest tightness or worsening PVCs.  She is doing okay with the diltiazem but has some fatigue and really does not want to increase this medication.  I am going to add some Cardura for her blood pressure and I think we need to send her over to  electrophysiology for possible PVC ablation since these are pretty bothersome to her.  I am jonny have her follow-up with Dr. GEOFF ZHOU RA in in a month.         Your medication list          Accurate as of February 8, 2023  3:15 PM. If you have any questions, ask your nurse or doctor.            START taking these medications      Instructions Last Dose Given Next Dose Due   doxazosin 2 MG tablet  Commonly known as: Cardura  Started by: TONY Ashley      Take 1 tablet by mouth Every Night.          CONTINUE taking these medications      Instructions Last Dose Given Next Dose Due   diclofenac 3 % gel gel  Commonly known as: VOLTAREN      Apply 2 g topically to the appropriate area as directed 2 (Two) Times a Day. for 60 days.       dilTIAZem 120 MG 24 hr capsule  Commonly known as: TIAZAC      diltiazem  mg capsule,extended release 24 hr       dilTIAZem  MG 24 hr capsule  Commonly known as: CARDIZEM CD      Take 1 capsule by mouth Daily.       docusate sodium 100 MG capsule  Commonly known as: COLACE      Take 100 mg by mouth 2 (Two) Times a Day.       estradiol 10 MCG tablet vaginal tablet  Commonly known as: VAGIFEM      Insert 1 tablet into the vagina 2 (Two) Times a Week.       glucosamine sulfate 500 MG capsule capsule      Take 500 mg by mouth 2 (Two) Times a Day.       guaiFENesin 600 MG 12 hr tablet  Commonly known as: MUCINEX      Take 1,200 mg by mouth 2 (Two) Times a Day.       LORazepam 0.5 MG tablet  Commonly known as: ATIVAN      0.5 mg. Take one half tablet by mouth every evening       losartan 100 MG tablet  Commonly known as: COZAAR      Take 100 mg by mouth Daily.       lysine 500 MG tablet      Take  by mouth Daily.       MAGNESIUM-POTASSIUM PO      Take  by mouth Daily.       TYLENOL PO      Take 1,000 mg by mouth Daily.       Vitamin D 50 MCG (2000 UT) tablet      Take 2,000 Units by mouth Daily.          STOP taking these medications    bisoprolol 5 MG tablet  Commonly known  as: ZEBeta  Stopped by: TONY Ashley              Where to Get Your Medications      These medications were sent to [facility] - [place], [place] - [address] [place] [address] - [phone] [facility] [phone] [address] [address]    Phone: [phone]   · doxazosin 2 MG tablet           As always, it has been a pleasure to participate in your patient's care.      Sincerely,     Lacie JIMENEZ

## 2023-02-10 ENCOUNTER — TELEPHONE (OUTPATIENT)
Dept: CARDIOLOGY | Facility: CLINIC | Age: 73
End: 2023-02-10
Payer: MEDICARE

## 2023-02-10 NOTE — TELEPHONE ENCOUNTER
----- Message from TONY Ashley sent at 2/10/2023 11:36 AM EST -----  Let patient know I spoke with Dr. Larsen and he agrees on her seeing electrophysiology. So she should be getting in with Chiara Palmer  ----- Message -----  From: Dheeraj Larsen Jr., MD  Sent: 2/9/2023   3:56 PM EST  To: TONY Ashley    I think EP evaluation would be a great idea  ----- Message -----  From: Lacie Holman APRN  Sent: 2/8/2023   2:51 PM EST  To: Dheeraj Larsen Jr., MD    I have been seeing this patient of yours due to pvc's and elevated. Her PVCs have improved some on Diltizaem. She has chest tightness with all betablockers I have tried. She does not drink caffeine and her labs were stable, including thryoid, k and Mg. Her BP is high and she has not tolerated amlodipine in the past. I put her on cardizem for the palpitations. Her PVCs slightly improve but she is fatigued on this medicine. I was wondering what you thought about sending her to EP for a PVC ablation. For BP which was high today I ordered cardura 2mg since she was not wanting to go up on the dilt. Thoughts?

## 2023-05-12 ENCOUNTER — OFFICE VISIT (OUTPATIENT)
Dept: CARDIOLOGY | Facility: CLINIC | Age: 73
End: 2023-05-12
Payer: MEDICARE

## 2023-05-12 VITALS
SYSTOLIC BLOOD PRESSURE: 140 MMHG | BODY MASS INDEX: 19.33 KG/M2 | HEART RATE: 68 BPM | HEIGHT: 65 IN | DIASTOLIC BLOOD PRESSURE: 72 MMHG | WEIGHT: 116 LBS

## 2023-05-12 DIAGNOSIS — R00.2 PALPITATIONS: Primary | ICD-10-CM

## 2023-05-12 DIAGNOSIS — I49.3 VENTRICULAR ECTOPY: ICD-10-CM

## 2023-05-12 NOTE — PROGRESS NOTES
Date of Office Visit: 2023  Encounter Provider: Dheeraj Guadarrama MD  Place of Service: [facility] CARDIOLOGY  Patient Name: Ariane Rod  : 1950    Subjective:     Encounter Date:2023      Patient ID: Ariane Rod is a 72 y.o. female who has a cc of  Referred by Suzie for PVCs.     She has had PVCs for years.     This winter she had a flare of increased PVCs -- every 2 or 3rd beat.     EF ok.     She passed a nuclear.     Side effects on bispoprolol -- made PVCs worse.     She nebivolol. -- fatigue     Also had side effects on metoprolol.     PVCs on holter 16%     Now her PVCs are better.     She has no palp when moving.     She exercises a lot.       No anginal chest pain,   No sig payne,   No soa,   No fainting,  No orthostasis.   No edema.   Exercise tolerance: really good.     There have been no hospital admission since the last visit.     There have been no bleeding events.       Past Medical History:   Diagnosis Date   • Cervical spondylosis    • Degeneration of lumbar intervertebral disc    • Essential hypertension    • Gastroesophageal reflux    • Herpes simplex    • Hypertension    • Insomnia    • Mixed hyperlipidemia    • Osteoarthritis    • Palpitations    • Tendinitis of right hip        Social History     Socioeconomic History   • Marital status:    Tobacco Use   • Smoking status: Former     Types: Cigarettes     Quit date:      Years since quittin.3   • Smokeless tobacco: Never   • Tobacco comments:     caffeine use- denies   Vaping Use   • Vaping Use: Never used   Substance and Sexual Activity   • Alcohol use: Yes     Comment: Socially   • Drug use: No   • Sexual activity: Defer       Family History   Problem Relation Age of Onset   • Arrhythmia Mother    • Hypertension Mother    • Cancer Father         bladder, brain and melanoma       Review of Systems   Constitutional: Negative for fever and night sweats.   HENT: Negative for ear  "pain and stridor.    Eyes: Negative for discharge and visual halos.   Cardiovascular: Negative for cyanosis.   Respiratory: Negative for hemoptysis and sputum production.    Hematologic/Lymphatic: Negative for adenopathy.   Skin: Negative for nail changes and unusual hair distribution.   Musculoskeletal: Negative for gout and joint swelling.   Gastrointestinal: Negative for bowel incontinence and flatus.   Genitourinary: Negative for dysuria and flank pain.   Neurological: Negative for seizures and tremors.   Psychiatric/Behavioral: Negative for altered mental status. The patient is not nervous/anxious.             Objective:     Vitals:    05/12/23 1009   BP: 140/72   Pulse: 68   Weight: 52.6 kg (116 lb)   Height: 165.1 cm (65\")         Eyes:      General:         Right eye: No discharge.         Left eye: No discharge.   HENT:      Head: Normocephalic and atraumatic.   Neck:      Thyroid: No thyromegaly.      Vascular: No JVD.   Pulmonary:      Effort: Pulmonary effort is normal.      Breath sounds: Normal breath sounds. No rales.   Cardiovascular:      Normal rate. Regular rhythm.      No gallop.   Edema:     Peripheral edema absent.   Abdominal:      General: Bowel sounds are normal.      Palpations: Abdomen is soft.      Tenderness: There is no abdominal tenderness.   Musculoskeletal: Normal range of motion.         General: No deformity. Skin:     General: Skin is warm and dry.      Findings: No erythema.   Neurological:      Mental Status: Alert and oriented to person, place, and time.      Motor: Normal muscle tone.   Psychiatric:         Behavior: Behavior normal.         Thought Content: Thought content normal.           ECG 12 Lead    Date/Time: 5/12/2023 10:58 AM  Performed by: Dheeraj Guadarrama MD  Authorized by: Dheeraj Guadarrama MD   Comparison: compared with previous ECG   Similar to previous ECG  Rhythm: sinus rhythm  Ectopy: unifocal PVCs  Conduction: incomplete left bundle branch block            Lab " Review:       Assessment:          Diagnosis Plan   1. Palpitations        2. Ventricular ectopy               Plan:       She has benign RVOT PVCs     No LVD     No ischemia     Normal EF     She has great exercise tolerance.     Of course we could ablate these if her symptoms worsen, but since they are benign, and since are symptoms I think we take a conservative approach and have her make peace with these pesky cells in the RVOT.

## 2023-11-08 ENCOUNTER — TRANSCRIBE ORDERS (OUTPATIENT)
Dept: PET IMAGING | Facility: HOSPITAL | Age: 73
End: 2023-11-08
Payer: MEDICARE

## 2023-11-08 ENCOUNTER — HOSPITAL ENCOUNTER (OUTPATIENT)
Dept: PET IMAGING | Facility: HOSPITAL | Age: 73
Discharge: HOME | End: 2023-11-08
Payer: MEDICARE

## 2023-11-08 ENCOUNTER — APPOINTMENT (OUTPATIENT)
Dept: WOMENS IMAGING | Facility: HOSPITAL | Age: 73
End: 2023-11-08
Payer: MEDICARE

## 2023-11-08 DIAGNOSIS — M81.0 HIGH RISK FOR FRACTURE DUE TO OSTEOPOROSIS BY DEXA SCAN: Primary | ICD-10-CM

## 2023-11-08 PROCEDURE — 77081 DXA BONE DENSITY APPENDICULR: CPT | Performed by: RADIOLOGY

## 2023-11-08 PROCEDURE — 77080 DXA BONE DENSITY AXIAL: CPT | Performed by: RADIOLOGY

## 2023-11-08 PROCEDURE — 77080 DXA BONE DENSITY AXIAL: CPT

## 2023-11-17 ENCOUNTER — OFFICE VISIT (OUTPATIENT)
Age: 73
End: 2023-11-17
Payer: MEDICARE

## 2023-11-17 VITALS
HEART RATE: 69 BPM | DIASTOLIC BLOOD PRESSURE: 88 MMHG | SYSTOLIC BLOOD PRESSURE: 146 MMHG | HEIGHT: 65 IN | WEIGHT: 117 LBS | BODY MASS INDEX: 19.49 KG/M2

## 2023-11-17 DIAGNOSIS — I10 ESSENTIAL HYPERTENSION: ICD-10-CM

## 2023-11-17 DIAGNOSIS — R00.2 PALPITATIONS: ICD-10-CM

## 2023-11-17 DIAGNOSIS — I44.7 LBBB (LEFT BUNDLE BRANCH BLOCK): ICD-10-CM

## 2023-11-17 DIAGNOSIS — I49.3 VENTRICULAR ECTOPY: Primary | ICD-10-CM

## 2023-11-17 PROCEDURE — 1159F MED LIST DOCD IN RCRD: CPT | Performed by: NURSE PRACTITIONER

## 2023-11-17 PROCEDURE — 99213 OFFICE O/P EST LOW 20 MIN: CPT | Performed by: NURSE PRACTITIONER

## 2023-11-17 PROCEDURE — 1160F RVW MEDS BY RX/DR IN RCRD: CPT | Performed by: NURSE PRACTITIONER

## 2023-11-17 PROCEDURE — 3079F DIAST BP 80-89 MM HG: CPT | Performed by: NURSE PRACTITIONER

## 2023-11-17 PROCEDURE — 3077F SYST BP >= 140 MM HG: CPT | Performed by: NURSE PRACTITIONER

## 2023-11-17 PROCEDURE — 93000 ELECTROCARDIOGRAM COMPLETE: CPT | Performed by: NURSE PRACTITIONER

## 2023-11-17 NOTE — PROGRESS NOTES
"Date of Office Visit: 2023  Encounter Provider: TONY Márquez  Place of Service: [facility] CARDIOLOGY  Patient Name: Ariane Rod  :1950    Chief Complaint   Patient presents with    Palpitations    ventricular ectopy   :     HPI: Ariane Rod is a 73 y.o. female who follows with Dr. Larsen for PVCs, LBBB---she was referred to Dr. Guadarrama when she had an increase in them.    Nuclear stress test in January showed no evidence of ischemia and normal EF.    48-hour monitor in January showed a 16% PVC burden.     He saw her in May--- Per her notes she has been tried on bisoprolol which made her PVCs worse and nebivolol caused her fatigue.    Per his note these are benign RVOT space PVCs with no ischemia or LV dysfunction and a great exercise tolerance the plan at that time was just to continue to monitor.    She presents for follow-up.    She has no complaints of chest pain, dyspnea, PND, orthopnea or edema.    She has not had any issues with palpitations recently---she says she saw her PCP in the last few months and she did a EKG---she was not having any palpitations at that time but she told her that she no longer felt them because \"every beat was a PVC\"---I am not sure this was the case as that would have been VT/NSVT so maybe she misunderstood regardless she feels well and has not has any issues in the last several months --normal echo and nuc. She is going to try and send me a copy of the EKG in [facility].    She is very active---she does line dancing, yoga, weight lifting, stationary bike, walks her dog.     Past Medical History:   Diagnosis Date    Cervical spondylosis     Degeneration of lumbar intervertebral disc     Essential hypertension     Gastroesophageal reflux     Herpes simplex     Hypertension     Insomnia     Mixed hyperlipidemia     Osteoarthritis     Palpitations     Tendinitis of right hip        Past Surgical History:   Procedure " Laterality Date    BLADDER REPAIR      CERVICAL LAMINOPLASTY      COLONOSCOPY      HYSTERECTOMY      OOPHORECTOMY      OTHER SURGICAL HISTORY      revision of Bladder and bowl    TONSILLECTOMY         Social History     Socioeconomic History    Marital status:    Tobacco Use    Smoking status: Former     Types: Cigarettes     Quit date:      Years since quittin.9    Smokeless tobacco: Never    Tobacco comments:     caffeine use- denies   Vaping Use    Vaping Use: Never used   Substance and Sexual Activity    Alcohol use: Yes     Comment: Socially    Drug use: No    Sexual activity: Defer       Family History   Problem Relation Age of Onset    Arrhythmia Mother     Hypertension Mother     Cancer Father         bladder, brain and melanoma       Review of Systems   Constitutional: Negative for chills, fever and malaise/fatigue.   Cardiovascular:  Negative for chest pain, dyspnea on exertion, leg swelling, near-syncope, orthopnea, palpitations, paroxysmal nocturnal dyspnea and syncope.   Respiratory:  Negative for cough and shortness of breath.    Musculoskeletal:  Negative for joint pain, joint swelling and myalgias.   Gastrointestinal:  Negative for abdominal pain, diarrhea, melena, nausea and vomiting.   Genitourinary:  Negative for frequency and hematuria.   Neurological:  Negative for light-headedness, numbness, paresthesias and seizures.   Allergic/Immunologic: Negative.    All other systems reviewed and are negative.      Allergies   Allergen Reactions    Bisoprolol Nausea Only     Nausea, lightheadedness    Diltiazem Dizziness     Dizziness, nausea, chest tightness    Ibuprofen Anaphylaxis    Metoprolol Unknown - High Severity     Chest tightness    Doxazosin Nausea Only     Nausea, constipation, nervousness         Current Outpatient Medications:     Acetaminophen (TYLENOL PO), Take 1,000 mg by mouth Daily., Disp: , Rfl:     Cholecalciferol (VITAMIN D) 2000 units tablet, Take 1 tablet by mouth  "Daily., Disp: , Rfl:     diclofenac (VOLTAREN) 3 % gel gel, Apply 2 g topically to the appropriate area as directed 2 (Two) Times a Day. for 60 days., Disp: , Rfl:     docusate sodium (COLACE) 100 MG capsule, Take 1 capsule by mouth 2 (Two) Times a Day., Disp: , Rfl:     estradiol (VAGIFEM) 10 MCG tablet vaginal tablet, Insert 1 tablet into the vagina 2 (Two) Times a Week., Disp: , Rfl:     glucosamine sulfate 500 MG capsule capsule, Take 1 capsule by mouth 2 (Two) Times a Day., Disp: , Rfl:     guaiFENesin (MUCINEX) 600 MG 12 hr tablet, Take 2 tablets by mouth 2 (Two) Times a Day., Disp: , Rfl:     LORazepam (ATIVAN) 0.5 MG tablet, 1 tablet. Take one half tablet by mouth every evening, Disp: , Rfl:     losartan (COZAAR) 100 MG tablet, Take 1 tablet by mouth Daily., Disp: , Rfl:     lysine 500 MG tablet, Take  by mouth Daily., Disp: , Rfl:     MAGNESIUM-POTASSIUM PO, Take  by mouth Daily., Disp: , Rfl:     Methylsulfonylmethane (MSM PO), Take  by mouth Daily., Disp: , Rfl:       Objective:     Vitals:    11/17/23 1332   BP: 146/88   Pulse: 69   Weight: 53.1 kg (117 lb)   Height: 165.1 cm (65\")     Body mass index is 19.47 kg/m².    PHYSICAL EXAM:    Vitals Reviewed.   General Appearance: No acute distress, well developed and well nourished.   Eyes: Conjunctiva and lids: No erythema, swelling, or discharge. Sclera non-icteric.   HENT: Atraumatic, normocephalic. External eyes, ears, and nose normal.   Respiratory: No signs of respiratory distress. Respiration rhythm and depth normal.   Clear to auscultation. No rales, crackles, rhonchi, or wheezing auscultated.   Cardiovascular:  Heart Rate and Rhythm: Normal, Heart Sounds: Normal S1 and S2  Murmurs: No murmurs noted. No rubs, thrills, or gallops.   Lower Extremities: No edema noted.  Gastrointestinal:  Abdomen soft, non-distended, non-tender.   Musculoskeletal: Normal movement of extremities  Skin: Warm and dry.   Psychiatric: Patient alert and oriented to person, " place, and time. Speech and behavior appropriate. Normal mood and affect.       ECG 12 Lead    Date/Time: 11/17/2023 1:25 PM  Performed by: Katie Castellon APRN    Authorized by: Katie Castellon APRN  Comparison: compared with previous ECG   Similar to previous ECG            Assessment:       Diagnosis Plan   1. Ventricular ectopy  ECG 12 Lead      2. Palpitations  ECG 12 Lead      3. LBBB (left bundle branch block)        4. Essential hypertension               Plan:       1-2. PVC's, palpitations---she had none on EKG today and she has not had any palpitations in several months. Normal echo and nuc.     3. LBBB, stable, unchanged.     4. HTN, controlled.    As always, it has been a pleasure to participate in your patient's care.      Sincerely,         TONY Morales

## 2024-04-08 ENCOUNTER — TELEPHONE (OUTPATIENT)
Dept: CARDIOLOGY | Facility: CLINIC | Age: 74
End: 2024-04-08
Payer: MEDICARE

## 2024-04-08 DIAGNOSIS — I49.3 VENTRICULAR ECTOPY: ICD-10-CM

## 2024-04-08 DIAGNOSIS — R00.2 PALPITATIONS: Primary | ICD-10-CM

## 2024-04-08 NOTE — TELEPHONE ENCOUNTER
Patient calls because she has been having more PVCs. She said they are showing on her monitor device and she can also feel them. She then states that starting Wednesday whenever she sits down her HR gets to 40 and she becomes lightheaded. She also said that her BP has been elevated. She just started checking it yesterday and it was 197/71. Today it is 164/76. She is on losartan 100mg daily. She is unable to tell me what times she is taking her BP readings, but I advised her to check it 1-2 hours after AM meds. She also wanted me to make note that she can not tolerate a lot of  medications and she would like you all to review those before you prescribe anything different for her.     Deidre Aguilar RN  Triage [facility]

## 2024-04-08 NOTE — TELEPHONE ENCOUNTER
Notified patient of recommendations. Patient verbalized understanding.    Scheduling: please schedule patient for heart monitor placement.     Deidre Aguilar RN  Triage [facility]

## 2024-04-08 NOTE — TELEPHONE ENCOUNTER
Called and left VM. Will continue to try to reach patient. HUB transfer call to triage.     Deidre Aguilar RN  Triage [facility]

## 2024-04-08 NOTE — TELEPHONE ENCOUNTER
She has upcoming appt with Dr. Larsen in May but I think we should do a monitor to assess her PVC burden and the times of low HR (this may be when she is having bigeminy or something along those lines)    She has had times in the past where her PVC's sort of flare up but then decrease    Monitor order is in

## 2024-04-25 ENCOUNTER — TELEPHONE (OUTPATIENT)
Dept: CARDIOLOGY | Facility: CLINIC | Age: 74
End: 2024-04-25
Payer: MEDICARE

## 2024-04-25 NOTE — TELEPHONE ENCOUNTER
"  Caller: Ariane Rod \"Sheeba\"    Relationship: Self    Best call back number: [phone]    What is the best time to reach you: ANY        Do you know the name of the person who called: PACO    What was the call regarding: PATIENT HOLTER MONITOR RESULTS    "

## 2024-05-23 ENCOUNTER — OFFICE VISIT (OUTPATIENT)
Dept: CARDIOLOGY | Facility: CLINIC | Age: 74
End: 2024-05-23
Payer: MEDICARE

## 2024-05-23 VITALS
SYSTOLIC BLOOD PRESSURE: 140 MMHG | DIASTOLIC BLOOD PRESSURE: 76 MMHG | OXYGEN SATURATION: 97 % | HEIGHT: 64 IN | WEIGHT: 120.8 LBS | HEART RATE: 73 BPM | BODY MASS INDEX: 20.62 KG/M2

## 2024-05-23 DIAGNOSIS — I44.7 LBBB (LEFT BUNDLE BRANCH BLOCK): ICD-10-CM

## 2024-05-23 DIAGNOSIS — R00.2 PALPITATIONS: Primary | ICD-10-CM

## 2024-05-23 PROCEDURE — 99214 OFFICE O/P EST MOD 30 MIN: CPT | Performed by: INTERNAL MEDICINE

## 2024-05-23 PROCEDURE — 1160F RVW MEDS BY RX/DR IN RCRD: CPT | Performed by: INTERNAL MEDICINE

## 2024-05-23 PROCEDURE — 1159F MED LIST DOCD IN RCRD: CPT | Performed by: INTERNAL MEDICINE

## 2024-05-23 PROCEDURE — 3078F DIAST BP <80 MM HG: CPT | Performed by: INTERNAL MEDICINE

## 2024-05-23 PROCEDURE — 3077F SYST BP >= 140 MM HG: CPT | Performed by: INTERNAL MEDICINE

## 2024-05-23 NOTE — PROGRESS NOTES
[facility]      Patient Name: Ariane Rod  :1950  Age: 73 y.o.  Encounter Provider:  Dheeraj Larsen Jr, MD      Chief Complaint:   Chief Complaint   Patient presents with    Palpitations         Palpitations   Pertinent negatives include no chest pain, coughing, dizziness, fever, near-syncope or syncope.     Ariane Rod is a 73 y.o. female with past medical history of hypertension presents for follow-up of palpitations.      Summary of clinic visitation: She had complained to her PCP of palpitations and dizziness occasionally associated with very atypical chest discomfort.  Treadmill EKG was performed where the patient exercised for 9 minutes and had no symptoms.  There were no EKG changes but interestingly in recovery she went into ventricular bigeminy.  She also had an echocardiogram showing left ventricular ejection fraction 58% with no valvular disease.  She also had a Holter monitor which showed no more than rare PACs and PVCs.  Interestingly on that Holter monitor she had multiple complaints of chest pain shortness of air and dizziness none of which correlated with arrhythmia.  She was placed on bystolic and since then has not experienced any of the a forementioned symptoms.  She denies tobacco alcohol or illicit drug use. No family history of sudden cardiac death.  She is tolerating all of her medications well at this time. Today she feels well.  No palpitations dizziness or syncope.  She is tolerating Bystolic therapy well.  She denies chest pain or shortness of air with activity.  She does note worsening leg pain.  Her right hip has been getting steroid injections for many years which have not been providing pain relief over the last 3 to 6 months.  She saw Dr. Jean Baptiste of [facility] and he is planning for anterior approach hip replacement on the right with labrum repair.  She denies any orthopnea, PND or edema.  Treadmill stress study,  echocardiogram and Holter monitor have been reported previously with no evidence of cardiac pathology other than ventricular ectopy for which she was started on a beta-blocker and remains asymptomatic.    Increased frequency of palpitations and dizziness.  She states that her blood pressure has been high at home but is uncertain of the accuracy of her blood pressure cuff.  When she got a very elevated blood pressure she spoke to her PCP because the heart rate was supposedly 40 bpm.  She was told to call cardiology and one of the EP nurse practitioners ordered a Holter which showed no significant bradycardia and 7% PVCs.  She has no angina or shortness of breath with activity.  No orthopnea, PND or edema.  She has increased frequency of palpitations with some dizziness but no syncope.  Blood pressure is borderline elevated today in clinic with heart rate of 73 bpm.  Social and family history reviewed are not pertinent to this clinic visit.    The following portions of the patient's history were reviewed and updated as appropriate: allergies, current medications, past family history, past medical history, past social history, past surgical history and problem list.      Review of Systems   Constitutional: Negative for chills and fever.   HENT:  Negative for hoarse voice and sore throat.    Eyes:  Negative for double vision and photophobia.   Cardiovascular:  Positive for palpitations. Negative for chest pain, leg swelling, near-syncope, orthopnea, paroxysmal nocturnal dyspnea and syncope.   Respiratory:  Negative for cough and wheezing.    Skin:  Negative for poor wound healing and rash.   Musculoskeletal:  Negative for arthritis and joint swelling.   Gastrointestinal:  Negative for bloating, abdominal pain, hematemesis and hematochezia.   Neurological:  Negative for dizziness and focal weakness.   Psychiatric/Behavioral:  Negative for depression and suicidal ideas.    All other systems reviewed and are  "negative.    ROS was reviewed, updated amended when necessary.    OBJECTIVE:   Vital Signs  Vitals:    05/23/24 1449   BP: 140/76   Pulse: 73   SpO2: 97%     Estimated body mass index is 20.74 kg/m² as calculated from the following:    Height as of this encounter: 162.6 cm (64\").    Weight as of this encounter: 54.8 kg (120 lb 12.8 oz).    Physical Exam  Vitals reviewed.   Constitutional:       Appearance: She is well-developed.   HENT:      Head: Normocephalic and atraumatic.   Eyes:      Conjunctiva/sclera: Conjunctivae normal.      Pupils: Pupils are equal, round, and reactive to light.   Neck:      Thyroid: No thyromegaly.      Vascular: No JVD.   Cardiovascular:      Heart sounds: No murmur heard.     No friction rub. No gallop.   Pulmonary:      Effort: No respiratory distress.   Chest:      Chest wall: No tenderness.   Abdominal:      General: Bowel sounds are normal. There is no distension.   Musculoskeletal:         General: No tenderness.   Skin:     Findings: No erythema or rash.   Neurological:      Mental Status: She is alert and oriented to person, place, and time.   Psychiatric:         Judgment: Judgment normal.     Physical exam was reviewed, updated and amended when necessary.    Procedures    Cardiac Procedures:  Treadmill EKG 2019 good functional capacity no ischemia  Transthoracic echo 2019 normal left ventricular ejection fraction no valvular heart disease  Holter monitor 2019 rare PVCs and PACs symptoms reported without corollary arrhythmia        ASSESSMENT:     Frequent PVCs    PLAN OF CARE:     1.  Ventricular ectopy -frequent PVCs but decrease burden from previous telemetry study.  No significant bradycardia.  She has complained of multiple potential adverse reactions with previous beta-blockers and calcium channel blockers.  Negative ischemic workup in the past.  I will get a talk to Dr. Guaadrrama first but I feel that she may be a candidate for low-dose class Ic antiarrhythmic like " flecainide.  She does not sound as though she would want to consider ablation but would be open to discussing this if Dr. Guadarrama felt that was best.    Return to clinic 12 months           Discharge Medications            Accurate as of May 23, 2024  2:52 PM. If you have any questions, ask your nurse or doctor.                Continue These Medications        Instructions Start Date   diclofenac 3 % gel gel  Commonly known as: VOLTAREN   2 g, Topical, 2 Times Daily, for 60 days.       docusate sodium 100 MG capsule  Commonly known as: COLACE   100 mg, Oral, 2 Times Daily      estradiol 10 MCG tablet vaginal tablet  Commonly known as: VAGIFEM   1 tablet, Vaginal, 2 Times Weekly      glucosamine sulfate 500 MG capsule capsule   500 mg, Oral, 2 Times Daily      guaiFENesin 600 MG 12 hr tablet  Commonly known as: MUCINEX   1,200 mg, Oral, 2 Times Daily      LORazepam 0.5 MG tablet  Commonly known as: ATIVAN   0.5 mg, Take one half tablet by mouth every evening      losartan 100 MG tablet  Commonly known as: COZAAR   100 mg, Oral, Daily      lysine 500 MG tablet   Oral, Daily      MAGNESIUM-POTASSIUM PO   Oral, Daily      MSM PO   Oral, Daily      TYLENOL PO   1,000 mg, Oral, Daily      Vitamin D 50 MCG (2000 UT) tablet   2,000 Units, Oral, Daily               Thank you for allowing me to participate in the care of your patient,      Sincerely,   Dheeraj Larsen MD  [facility]  05/23/24  14:52 EDT    **Cristo Disclaimer:**  Much of this encounter note is an electronic transcription/translation of spoken language to printed text. The electronic translation of spoken language may permit erroneous, or at times, nonsensical words or phrases to be inadvertently transcribed. Although I have reviewed the note for such errors, some may still exist.

## 2024-05-30 ENCOUNTER — TELEPHONE (OUTPATIENT)
Dept: CARDIOLOGY | Facility: CLINIC | Age: 74
End: 2024-05-30
Payer: MEDICARE

## 2024-05-30 RX ORDER — FLECAINIDE ACETATE 50 MG/1
50 TABLET ORAL 2 TIMES DAILY
Qty: 60 TABLET | Refills: 11 | Status: SHIPPED | OUTPATIENT
Start: 2024-05-30

## 2024-05-30 RX ORDER — FLECAINIDE ACETATE 50 MG/1
50 TABLET ORAL 2 TIMES DAILY
Qty: 60 TABLET | Refills: 11 | Status: SHIPPED | OUTPATIENT
Start: 2024-05-30 | End: 2024-05-30

## 2024-05-30 NOTE — TELEPHONE ENCOUNTER
Spoke to patient about conversation with electrophysiology.  Dr. Guadarrama and I both agree that flecainide is a better choice than ablation at this time for PVCs.  Negative stress study noted.  Follow clinical progress.

## 2024-05-31 ENCOUNTER — CLINICAL SUPPORT (OUTPATIENT)
Dept: CARDIOLOGY | Facility: CLINIC | Age: 74
End: 2024-05-31
Payer: MEDICARE

## 2024-05-31 ENCOUNTER — TELEPHONE (OUTPATIENT)
Dept: CARDIOLOGY | Facility: CLINIC | Age: 74
End: 2024-05-31
Payer: MEDICARE

## 2024-05-31 DIAGNOSIS — I49.3 VENTRICULAR ECTOPY: ICD-10-CM

## 2024-05-31 DIAGNOSIS — Z01.30 BP CHECK: Primary | ICD-10-CM

## 2024-05-31 NOTE — PROGRESS NOTES
Bp cHECK    Pt came into today for a bp check, Pt's bp was checked with home machine and then compared to a manual bp cuff/check.     BP check/home machine : Lt 153/80    Rt arm bp check/manually : 142/77  HR: 88  O2 : 96    Lt arm Bp check/manually : 147/78     Spoke with AL, she advised that the Pt will need to come back for an EKG check 1 week after starting Tambocor.   Pt verbalized understanding.    Pt also asked to monitor BP and keep a log that she will need to bring back with her at the EKG check.  Pt verbalized understanding.

## 2024-05-31 NOTE — PROGRESS NOTES
We will trust patient's home blood pressure cuff.  I would have her document some home blood pressures and ensure blood pressures not too often greater than 145/85 at home and plan to continue losartan 100 mg daily at this time.  I would need for her to come back for an EKG check after starting flecainide 50 mg twice daily no later than 1 week.  We will have her bring her blood pressure list at the time of her EKG check after starting flecainide 50 mg twice daily for PVCs.

## 2024-05-31 NOTE — TELEPHONE ENCOUNTER
Pt returned call to the office.  She realizes that there was a discrepancy between her home BP machine and the number obtained in the office by the office BP machine.  She wants to know what she needs to do about this.  She wasn't instructed whether or not her machine is accurate, as this was the whole purpose of her coming in today.        I did relay to her again that she's to monitor her BP and keep a log for the next week, bringing this in with her to the EKG check.  Do you have any recommendations for this patient?    Thank you,    Teena POWELL RN  [facility] Triage  05/31/24  15:56 EDT

## 2024-06-03 NOTE — TELEPHONE ENCOUNTER
I spoke with Ariane Rod and gave them message from the provider.  They verbalized understanding & have no further questions at this time.    Thank you,    Teena POWELL , RN  Triage [facility]  06/03/24 08:18 EDT

## 2024-06-13 ENCOUNTER — TELEPHONE (OUTPATIENT)
Dept: CARDIOLOGY | Facility: CLINIC | Age: 74
End: 2024-06-13

## 2024-06-13 NOTE — TELEPHONE ENCOUNTER
"  Caller: Ariane Rod \"Sheeba\"    Relationship: Self    Best call back number: [phone] YOU CAN LEAVE VM     What is the best time to reach you: ANYTIME    Who are you requesting to speak with (clinical staff, provider,  specific staff member): CLINICAL        What was the call regarding: PT WILL BE STARTING FLECAINIDE 50 MG 06/20/2024, SHE WAS TOLD SHE WOULD NEED ECG 1 WEEK LATER.  COULD SHE BE SCHEDULED FOR ECG 06/27/2024?   PLEASE CALL PT TO SCHEDULE       " 100 mL IVPB  2 g Intravenous On Call to Thmoas Cyr III, MD        indocyanine green (IC-GREEN) syringe 5 mg  5 mg Intravenous On Call to Thomas Cyr III, MD           Allergies:     Allergies   Allergen Reactions    Pcn [Penicillins] Swelling       Problem List:    Patient Active Problem List   Diagnosis Code    DEL CASTILLO (dyspnea on exertion) R06.09    Hypertension I10    Hyperlipidemia E78.5    CAD (coronary artery disease) I25.10    Esophageal stricture K22.2    GERD (gastroesophageal reflux disease) K21.9    Type 2 diabetes mellitus without complication, without long-term current use of insulin ([facility]) E11.9       Past Medical History:        Diagnosis Date    Biliary stone     CAD (coronary artery disease) 2002    follows with Dr. Lei Spotted yearly    Carotid artery stenosis     Dr. Brian Caballero per patient    Esophageal stricture     when eats has to eat slow    GERD (gastroesophageal reflux disease)     Hyperlipidemia     Hypertension     Type 2 diabetes mellitus without complication ([address]        Past Surgical History:        Procedure Laterality Date    CORONARY ARTERY BYPASS GRAFT  2002    DIAGNOSTIC CARDIAC CATH LAB PROCEDURE  2002    HERNIA REPAIR  2003       Social History:    Social History   Substance Use Topics    Smoking status: Former Smoker     Packs/day: 1.00     Years: 5.00     Types: Pipe, Cigars     Start date: 1/1/1960     Quit date: 1/1/1965    Smokeless tobacco: Never Used    Alcohol use Yes     1 Shots of liquor per week      Comment: 1-2 per week                                Counseling given: Not Answered      Vital Signs (Current):   Vitals:    08/24/18 1610 09/06/18 0738   BP:  107/79   Pulse:  72   Resp:  20   Temp:  97.9 °F (36.6 °C)   TempSrc:  Temporal   SpO2:  95%   Weight: 152 lb (68.9 kg) 152 lb (68.9 kg)   Height: 5' 7\" (1.702 m) 5' 7\" (1.702 m)                                              BP Readings from Last 3 Encounters:   09/06/18 Rhythm: regular  Rate: normal           Beta Blocker:  Not on Beta Blocker         Neuro/Psych:   Negative Neuro/Psych ROS              GI/Hepatic/Renal:   (+) GERD:,           Endo/Other:    (+) DiabetesType II DM, , .                 Abdominal:           Vascular: negative vascular ROS. Anesthesia Plan      general     ASA 3       Induction: intravenous. MIPS: Postoperative opioids intended. Anesthetic plan and risks discussed with patient and child/children.       Plan discussed with CRNA and surgical team.                  Ian Penn MD   9/6/2018

## 2024-06-17 NOTE — TELEPHONE ENCOUNTER
I called and scheduled patient  She will be in on 6/28/2024 at 1:30 PM at the [facility] office for a EKG check only.       Thanks  PAPITO Mars   06/17/2024

## 2024-06-28 ENCOUNTER — CLINICAL SUPPORT (OUTPATIENT)
Dept: CARDIOLOGY | Facility: CLINIC | Age: 74
End: 2024-06-28
Payer: MEDICARE

## 2024-06-28 VITALS — SYSTOLIC BLOOD PRESSURE: 128 MMHG | DIASTOLIC BLOOD PRESSURE: 74 MMHG

## 2024-06-28 DIAGNOSIS — I48.91 ATRIAL FIBRILLATION, UNSPECIFIED TYPE: Primary | ICD-10-CM

## 2024-06-28 DIAGNOSIS — I44.7 LBBB (LEFT BUNDLE BRANCH BLOCK): Primary | ICD-10-CM

## 2024-06-28 PROCEDURE — 93000 ELECTROCARDIOGRAM COMPLETE: CPT | Performed by: NURSE PRACTITIONER

## 2024-06-28 RX ORDER — VALSARTAN 160 MG/1
160 TABLET ORAL DAILY
Qty: 30 TABLET | Refills: 11 | Status: SHIPPED | OUTPATIENT
Start: 2024-06-28

## 2024-06-28 NOTE — PROGRESS NOTES
Procedure     ECG 12 Lead    Date/Time: 6/28/2024 5:05 PM  Performed by: Lacie Holman APRN    Authorized by: Lacie Holman APRN  Comparison: compared with previous ECG from 5/12/2023  Similar to previous ECG  Rhythm: sinus rhythm  Rate: normal  Conduction: left bundle branch block  QRS axis: normal    Clinical impression: abnormal EKG  Comments:                Pt came in for a EKG and BP check.   Pt was started on flecainide 50mg PO BID 1 week ago.    Pt stated her SXS have improved and since starting flecainide she has not had anymore palpitations.     Pt's BP was checked also, reading in the office was 128/74  Pt also brought 1 week's worth of BP readings from home, which has been scanned in.     EKG was reviewed by TONY Baeza.  Medication added per TONY Medellin     Discontinue losartan and start valsartan 160mg. PO QD

## 2024-07-05 ENCOUNTER — CLINICAL SUPPORT (OUTPATIENT)
Dept: CARDIOLOGY | Facility: CLINIC | Age: 74
End: 2024-07-05
Payer: MEDICARE

## 2024-07-05 VITALS
BODY MASS INDEX: 20.66 KG/M2 | DIASTOLIC BLOOD PRESSURE: 80 MMHG | WEIGHT: 121 LBS | SYSTOLIC BLOOD PRESSURE: 138 MMHG | HEIGHT: 64 IN | HEART RATE: 64 BPM

## 2024-07-05 DIAGNOSIS — I44.7 LBBB (LEFT BUNDLE BRANCH BLOCK): ICD-10-CM

## 2024-07-05 DIAGNOSIS — I48.91 ATRIAL FIBRILLATION, UNSPECIFIED TYPE: ICD-10-CM

## 2024-07-05 DIAGNOSIS — I49.3 VENTRICULAR ECTOPY: Primary | ICD-10-CM

## 2024-07-05 NOTE — PROGRESS NOTES
Procedure     ECG 12 Lead    Date/Time: 7/5/2024 1:52 PM  Performed by: Manuela Hawthorne APRN    Authorized by: Lacie Holman APRN  Comparison: compared with previous ECG   Similar to previous ECG  Rhythm: sinus rhythm  Rate: normal  Conduction: left bundle branch block  QRS axis: normal    Clinical impression: abnormal EKG  Comments: Stable QTc with flecainide and left bundle branch block         Patient can continue current regimen.  Blood pressure has been stable.

## 2024-07-12 ENCOUNTER — PATIENT MESSAGE (OUTPATIENT)
Dept: CARDIOLOGY | Facility: CLINIC | Age: 74
End: 2024-07-12
Payer: MEDICARE

## 2024-07-12 RX ORDER — VALSARTAN 160 MG/1
160 TABLET ORAL DAILY
Qty: 90 TABLET | Refills: 3 | Status: SHIPPED | OUTPATIENT
Start: 2024-07-12

## 2024-11-06 ENCOUNTER — OFFICE VISIT (OUTPATIENT)
Dept: NEUROLOGY | Facility: CLINIC | Age: 74
End: 2024-11-06
Payer: MEDICARE

## 2024-11-06 VITALS
HEART RATE: 65 BPM | WEIGHT: 119 LBS | OXYGEN SATURATION: 99 % | SYSTOLIC BLOOD PRESSURE: 168 MMHG | DIASTOLIC BLOOD PRESSURE: 62 MMHG | BODY MASS INDEX: 20.43 KG/M2

## 2024-11-06 DIAGNOSIS — M79.602 LEFT ARM PAIN: Primary | ICD-10-CM
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
## 2024-11-07 ENCOUNTER — PATIENT ROUNDING (BHMG ONLY) (OUTPATIENT)
Dept: NEUROLOGY | Facility: CLINIC | Age: 74
End: 2024-11-07
Payer: MEDICARE

## 2024-11-13 ENCOUNTER — APPOINTMENT (OUTPATIENT)
Dept: WOMENS IMAGING | Facility: HOSPITAL | Age: 74
End: 2024-11-13
Payer: MEDICARE

## 2024-11-13 PROCEDURE — 77063 BREAST TOMOSYNTHESIS BI: CPT | Performed by: RADIOLOGY

## 2024-11-13 PROCEDURE — 77067 SCR MAMMO BI INCL CAD: CPT | Performed by: RADIOLOGY

## 2025-01-31 ENCOUNTER — OFFICE VISIT (OUTPATIENT)
Age: 75
End: 2025-01-31
Payer: MEDICARE

## 2025-01-31 VITALS — DIASTOLIC BLOOD PRESSURE: 80 MMHG | SYSTOLIC BLOOD PRESSURE: 144 MMHG

## 2025-01-31 DIAGNOSIS — I47.19 PAT (PAROXYSMAL ATRIAL TACHYCARDIA): ICD-10-CM

## 2025-01-31 DIAGNOSIS — I49.3 VENTRICULAR ECTOPY: Primary | ICD-10-CM

## 2025-01-31 DIAGNOSIS — I44.7 LBBB (LEFT BUNDLE BRANCH BLOCK): ICD-10-CM

## 2025-01-31 DIAGNOSIS — I10 ESSENTIAL HYPERTENSION: ICD-10-CM

## 2025-01-31 PROBLEM — R00.2 PALPITATIONS: Status: RESOLVED | Noted: 2019-08-12 | Resolved: 2025-01-31

## 2025-01-31 PROCEDURE — 1159F MED LIST DOCD IN RCRD: CPT | Performed by: PHYSICIAN ASSISTANT

## 2025-01-31 PROCEDURE — 93000 ELECTROCARDIOGRAM COMPLETE: CPT | Performed by: PHYSICIAN ASSISTANT

## 2025-01-31 PROCEDURE — 1160F RVW MEDS BY RX/DR IN RCRD: CPT | Performed by: PHYSICIAN ASSISTANT

## 2025-01-31 PROCEDURE — 99214 OFFICE O/P EST MOD 30 MIN: CPT | Performed by: PHYSICIAN ASSISTANT

## 2025-01-31 PROCEDURE — 3079F DIAST BP 80-89 MM HG: CPT | Performed by: PHYSICIAN ASSISTANT

## 2025-01-31 PROCEDURE — 3077F SYST BP >= 140 MM HG: CPT | Performed by: PHYSICIAN ASSISTANT

## 2025-01-31 RX ORDER — LORAZEPAM 1 MG/1
1 TABLET ORAL
COMMUNITY
Start: 2024-10-10

## 2025-01-31 RX ORDER — ZOLPIDEM TARTRATE 5 MG/1
1 TABLET ORAL
COMMUNITY
Start: 2024-09-23

## 2025-01-31 NOTE — PATIENT INSTRUCTIONS
Keep Blood pressure log with goal of -140 and DBP  60-90, contact office if multiple readings out of range

## 2025-01-31 NOTE — PROGRESS NOTES
"      ELECTROPHYSIOLOGY   Date of Office Visit: 2025  Patient Name: Ariane Rod  : 1950  Encounter Provider: Jose Espinoza PA-C  Primary Cardiologist: Dheeraj Larsen MD  Electrophysiologist: Dr. Guadarrama  CHIEF COMPLAINT / REASON FOR OFFICE VISIT     LBBB and PVCs  1 year follow up    HISTORY OF PRESENT ILLNESS     This is a 74 y.o. year old female who presents to [facility] CARDIOLOGY for a     She has a longstanding history of symptomatic PVCs with chronic left bundle branch block.    She has had a stress test in the past that showed normal ejection fraction no evidence of myocardial ischemia.    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  She continued to have symptoms of intermittent dizziness and lightheadedness and is started on flecainide 6 months ago 50 mg twice daily.    Today the patient reports starting the flecainide her symptoms have dramatically improved.  She is no longer having dizziness with positional changes and feels much better.  She does not discern any extra beats anymore.    She denies nausea, chest pain, diaphoresis or lower extremity edema.    She still maintains a very active lifestyle and is frequently walking her dog and doing yoga.    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    Weight as of 24: 54 kg (119 lb).       BMI is within normal parameters. No other follow-up for BMI required.      Physical Exam  Constitutional:       Appearance: Normal appearance.   HENT:      Head: Normocephalic and atraumatic.   Cardiovascular:      Rate and Rhythm: Normal rate and regular rhythm.      Pulses: Normal pulses.      Heart sounds: Normal heart sounds.   Pulmonary:      " "Effort: Pulmonary effort is normal.      Breath sounds: Normal breath sounds.   Musculoskeletal:      Right lower leg: No edema.      Left lower leg: No edema.   Skin:     General: Skin is warm and dry.   Neurological:      General: No focal deficit present.      Mental Status: She is alert and oriented to person, place, and time.          Cardiac Testing/Results     Cardiac Testing:   -Holter monitor 4/24/2024: Predominant sinus rhythm with a 6 beat run of NSVT short-lived runs of nonspecific atrial tachycardia.  PVC burden is 7% with PACs of 3%    Result Review :  The following data was reviewed by: Jose Espinoza PA-C on 01/31/2025:       Lab Results   Component Value Date     12/21/2022     03/05/2020    K 4.2 12/21/2022    K 4.2 03/05/2020     12/21/2022     03/05/2020    CO2 26.8 12/21/2022    CO2 29 03/05/2020    BUN 17 12/21/2022    BUN 17 03/05/2020    CREATININE 0.62 12/21/2022    CREATININE 0.6 (L) 03/05/2020    GLUCOSE 97 12/21/2022    GLUCOSE 97 06/02/2014    CALCIUM 8.7 12/21/2022    CALCIUM 9.3 03/05/2020    ALBUMIN 4.2 03/05/2020    AST 35 03/05/2020    ALT 30 03/05/2020     Lab Results   Component Value Date    WBC 5.93 03/05/2020    WBC 4.48 (L) 04/21/2015    HGB 13.5 03/05/2020    HGB 14.1 04/21/2015    HCT 41.1 03/05/2020    HCT 43.9 04/21/2015    MCV 93.4 03/05/2020    MCV 93.2 04/21/2015     03/05/2020     04/21/2015     No results found for: \"PROBNP\", \"BNP\"  No results found for: \"CKTOTAL\", \"CKMB\", \"CKMBINDEX\", \"TROPONINI\", \"TROPONINT\"  Lab Results   Component Value Date    TSH 0.929 12/21/2022                 ECG 12 Lead    Date/Time: 1/31/2025 1:43 PM  Performed by: Jose Espinoza PA-C    Authorized by: Jose Espinoza PA-C  Comparison: compared with previous ECG from 7/5/2024  Rate: normal  BPM: 68  Conduction: left bundle branch block  QRS axis: normal  Comments: 1 PVC with a 6 beat run of PAT                ASSESSMENT & PLAN "       Diagnoses and all orders for this visit:    1. 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  Ectopy has improved per her account with starting flecainide.  Continue flecainide 50 mg twice daily.  QTc interval today when corrected at 483  Will continue this current treatment as it is controlling things well but if she has breakthrough symptoms she will need to call our office for further recommendations.  2. LBBB (left bundle branch block)  Chronic left bundle branch block.  Stable on EKG today.  She needs to continue to follow-up intermittently with cardiology.  Previous stress test have been normal  3. Essential hypertension  Blood pressures been well-controlled.  She is to have symptoms of orthostasis at times with this has completely resolved since starting flecainide  4. PAT (paroxysmal atrial tachycardia)  Noted 6 beat run on EKG today and she has had them previously on Zio patch as in the past.  She is asymptomatic with this and does not have any discernible symptoms.  Will stick with flecainide as previously as the beta-blockers had made her arrhythmias worse.      Follow Up:  Return in about 1 year (around 1/31/2026) for Dr. Guadarrama- Routine.  Patient was given instructions and counseling regarding her condition or for health maintenance advice. Please contact office if worsening symptoms or proceed to ER when appropriate.      Jose Espinoza PA-C  01/31/25  14:32 EST    MEDICATIONS         Discharge Medications            Accurate as of January 31, 2025  2:32 PM. If you have any questions, ask your nurse or doctor.                Continue These Medications        Instructions Start Date   diclofenac 3 % gel gel  Commonly known as: VOLTAREN   2 g, 2 Times Daily      docusate sodium 100 MG capsule  Commonly known as: COLACE   100 mg, 2 Times Daily      estradiol 10 MCG tablet vaginal tablet  Commonly known as: VAGIFEM   1 tablet, 2 Times Weekly      flecainide 50 MG tablet  Commonly  known as: TAMBOCOR   50 mg, Oral, 2 Times Daily      glucosamine sulfate 500 MG capsule capsule   500 mg, 2 Times Daily      guaiFENesin 600 MG 12 hr tablet  Commonly known as: MUCINEX   1,200 mg, 2 Times Daily      LORazepam 1 MG tablet  Commonly known as: ATIVAN   1 tablet, Every Night at Bedtime      lysine 500 MG tablet   Daily      MAGNESIUM-POTASSIUM PO   Daily      MSM PO   Daily      TYLENOL PO   1,000 mg, Daily      valsartan 160 MG tablet  Commonly known as: DIOVAN   160 mg, Oral, Daily      Vitamin D 50 MCG (2000 UT) tablet   2,000 Units, Daily      zolpidem 5 MG tablet  Commonly known as: AMBIEN   1 tablet, Every Night at Bedtime                   **Dragon Disclaimer: This note was dictated using an electronic transcription. The electronic translation of spoken language may permit erroneous, or at times, nonsensical words or phrases to be inadvertently transcribed. Although I have reviewed the note for such errors, some may still exist.

## 2025-03-21 RX ORDER — FLECAINIDE ACETATE 50 MG/1
50 TABLET ORAL 2 TIMES DAILY
Qty: 180 TABLET | Refills: 3 | Status: SHIPPED | OUTPATIENT
Start: 2025-03-21

## 2025-07-03 NOTE — PROGRESS NOTES
RM:________                            : 1950  AGE: 74 y.o.      WT: ____________ HT: ______ BP: __________ HR ______   02% _______                   VISIT:   F/U   HOSP  CC __________________________ OTHER _________                                                  PACER/ ICD        EKG TODAY:  Y /  N        SMOKING: Y / N   PPD ________      EXERCISE: ____________________________________________________      SLEEP STUDY : Y / N     POS / NEG    CPAP / BIPAP     WEARING:  Y / N       DIAGNOSIS: ___________________________________   REFILLS  Y / N      CP _____  SOA______ EDEMA_____ DIZZINESS____ PALPITATIONS____

## 2025-07-09 ENCOUNTER — OFFICE VISIT (OUTPATIENT)
Dept: CARDIOLOGY | Age: 75
End: 2025-07-09
Payer: MEDICARE

## 2025-07-09 VITALS
WEIGHT: 114 LBS | DIASTOLIC BLOOD PRESSURE: 60 MMHG | BODY MASS INDEX: 19.46 KG/M2 | HEIGHT: 64 IN | HEART RATE: 63 BPM | SYSTOLIC BLOOD PRESSURE: 130 MMHG

## 2025-07-09 DIAGNOSIS — I34.0 NONRHEUMATIC MITRAL VALVE REGURGITATION: Primary | ICD-10-CM

## 2025-07-09 DIAGNOSIS — I49.3 VENTRICULAR ECTOPY: ICD-10-CM

## 2025-07-09 DIAGNOSIS — I44.7 LBBB (LEFT BUNDLE BRANCH BLOCK): ICD-10-CM

## 2025-07-09 DIAGNOSIS — R01.1 MURMUR: ICD-10-CM

## 2025-07-09 PROCEDURE — 93000 ELECTROCARDIOGRAM COMPLETE: CPT | Performed by: NURSE PRACTITIONER

## 2025-07-09 PROCEDURE — 99214 OFFICE O/P EST MOD 30 MIN: CPT | Performed by: NURSE PRACTITIONER

## 2025-07-09 PROCEDURE — 3078F DIAST BP <80 MM HG: CPT | Performed by: NURSE PRACTITIONER

## 2025-07-09 PROCEDURE — 1160F RVW MEDS BY RX/DR IN RCRD: CPT | Performed by: NURSE PRACTITIONER

## 2025-07-09 PROCEDURE — 1159F MED LIST DOCD IN RCRD: CPT | Performed by: NURSE PRACTITIONER

## 2025-07-09 PROCEDURE — 3075F SYST BP GE 130 - 139MM HG: CPT | Performed by: NURSE PRACTITIONER

## 2025-07-09 RX ORDER — VALSARTAN 160 MG/1
160 TABLET ORAL DAILY
Qty: 90 TABLET | Refills: 3 | Status: SHIPPED | OUTPATIENT
Start: 2025-07-09

## 2025-07-09 NOTE — PROGRESS NOTES
"Date of Office Visit: 25  Encounter Provider: TONY Stevens  Place of Service: [facility] CARDIOLOGY  Patient Name: Ariane Rod  :1950    Chief Complaint   Patient presents with    Ventricular ectopy    Hypertension    Left Bundle Branch Block    Follow-up   :     HPI: Ariane Rod is a 74 y.o. female  with paroxysmal atrial tachycardia, hypertension, ventricular ectopy, left bundle branch block, mitral valve regurgitation, cervical disc disease and urogynecology disorder.  She is a former smoker.      She is followed by Dr. Dheeraj Larsen.  I will visit her for the first time and have reviewed her medical record    In 2019 she complained of palpitations and had evidence of symptomatic PVCs.  Follow-up echo 2020 showed normal left ventricular systolic function, normal diastolic function, mild mitral valve regurgitation, tricuspid valve regurgitation and normal RVSP.  5 Holter monitor 2024 showed 7% PVC burden and 3% premature atrial contraction.    She was started on flecainide 50 mg twice daily.    She presents today for reassessment.  She has been living in [facility] and had some brief palpitations 1 week ago but these were \"very short-lived and not troublesome\".  No chest pain or shortness of breath or edema or dizziness.  She does stationary bike, yoga and walks and does weights to stay active.  She follows with urogynecology and has occasional cervical spine epidurals.        Allergies   Allergen Reactions    Bisoprolol Nausea Only     Nausea, lightheadedness    Diltiazem Dizziness     Dizziness, nausea, chest tightness    Ibuprofen Anaphylaxis    Metoprolol Unknown - High Severity     Chest tightness    Doxazosin Nausea Only     Nausea, constipation, nervousness    Morphine Unknown - Low Severity    Nortriptyline Confusion    Oxycodone Other (See Comments)    Oxycodone-Acetaminophen Other (See Comments)    Trazodone Other (See " "Comments)           Family and social history reviewed.     ROS  All other systems were reviewed and are negative          Objective:     Vitals:    07/09/25 1348   BP: 130/60   BP Location: Left arm   Patient Position: Sitting   Cuff Size: Adult   Pulse: 63   Weight: 51.7 kg (114 lb)   Height: 162.6 cm (64\")     Body mass index is 19.57 kg/m².    PHYSICAL EXAM:  Pulmonary:      Effort: Pulmonary effort is normal.      Breath sounds: Normal breath sounds.   Cardiovascular:      Normal rate. Regular rhythm.      Murmurs: There is a grade 1/6 systolic murmur.           ECG 12 Lead    Date/Time: 7/9/2025 2:19 PM  Performed by: Manuela Hawthorne APRN    Authorized by: Manuela Hawthorne APRN  Comparison: compared with previous ECG   Similar to previous ECG  Rhythm: sinus rhythm  Rate: normal  Conduction: left bundle branch block  QRS axis: left    Clinical impression: abnormal EKG            Current Outpatient Medications   Medication Sig Dispense Refill    Acetaminophen (TYLENOL PO) Take 1,000 mg by mouth Daily.      Cholecalciferol (VITAMIN D) 2000 units tablet Take 1 tablet by mouth Daily.      diclofenac (VOLTAREN) 3 % gel gel Apply 2 g topically to the appropriate area as directed 2 (Two) Times a Day. for 60 days.      docusate sodium (COLACE) 100 MG capsule Take 1 capsule by mouth 2 (Two) Times a Day.      estradiol (VAGIFEM) 10 MCG tablet vaginal tablet Insert 1 tablet into the vagina 2 (Two) Times a Week.      flecainide (TAMBOCOR) 50 MG tablet Take 1 tablet by mouth 2 (Two) Times a Day. 180 tablet 3    glucosamine sulfate 500 MG capsule capsule Take 1 capsule by mouth 2 (Two) Times a Day.      guaiFENesin (MUCINEX) 600 MG 12 hr tablet Take 2 tablets by mouth 2 (Two) Times a Day.      LORazepam (ATIVAN) 1 MG tablet Take 1 tablet by mouth every night at bedtime.      lysine 500 MG tablet Take  by mouth Daily.      MAGNESIUM PO Take  by mouth Daily.      valsartan (DIOVAN) 160 MG tablet Take 1 tablet by mouth Daily. 90 " tablet 3    zolpidem (AMBIEN) 5 MG tablet Take 1 tablet by mouth every night at bedtime.      MAGNESIUM-POTASSIUM PO Take  by mouth Daily.      Methylsulfonylmethane (MSM PO) Take  by mouth Daily.       No current facility-administered medications for this visit.     Assessment:      No diagnosis found.     No orders of the defined types were placed in this encounter.        Plan:       1.  74-year-old female with longstanding history of symptomatic PVCs-maintained on flecainide 50 mg twice daily and follows with Dr. Dheeraj Guadarrama  -QTc stable.  Continue current dose flecainide 50 mg twice daily  -For by palpitation she takes an extra half tablet of flecainide which resolved her symptoms.  She reportedly has only done this twice in the past year.  2.  Left bundle-branch block, chronic  3.  Paroxysmal atrial tachycardia  4.  Hypertension-stable  5.  Faint cardiac murmur-history of mild mitral valve regurgitation last echo December 2020.  I recommend repeat echo when she returns  6.  Urogynecology issues with history of bladder lift and enterocele  7.  Former smoker      Keep appointment in February to follow-up with Dr. Guadarrama with electrophysiology and call sooner with any other concerns or questions          It has been a pleasure to participate in this patient's care.      Thank you,  TONY Stevens      **I used Dragon to dictate this note:**